# Patient Record
Sex: MALE | Race: WHITE | NOT HISPANIC OR LATINO | Employment: OTHER | URBAN - METROPOLITAN AREA
[De-identification: names, ages, dates, MRNs, and addresses within clinical notes are randomized per-mention and may not be internally consistent; named-entity substitution may affect disease eponyms.]

---

## 2020-08-07 RX ORDER — ASPIRIN 325 MG
325 TABLET ORAL EVERY OTHER DAY
COMMUNITY

## 2020-08-07 RX ORDER — ATORVASTATIN CALCIUM 40 MG/1
40 TABLET, FILM COATED ORAL DAILY
COMMUNITY
End: 2021-01-22 | Stop reason: SDUPTHER

## 2020-08-07 RX ORDER — UBIDECARENONE 10 MG
10 CAPSULE ORAL DAILY
COMMUNITY

## 2020-08-07 RX ORDER — DIPHENOXYLATE HYDROCHLORIDE AND ATROPINE SULFATE 2.5; .025 MG/1; MG/1
1 TABLET ORAL DAILY
COMMUNITY

## 2020-08-07 RX ORDER — LISINOPRIL 2.5 MG/1
TABLET ORAL
COMMUNITY
Start: 2020-07-09

## 2020-08-07 RX ORDER — LUTEIN 10 MG
TABLET ORAL DAILY
COMMUNITY

## 2020-08-07 RX ORDER — IRON POLYSACCHARIDE COMPLEX 150 MG
150 CAPSULE ORAL 2 TIMES DAILY
COMMUNITY
End: 2021-03-19 | Stop reason: ALTCHOICE

## 2020-08-07 RX ORDER — HYDROCORTISONE ACETATE 0.5 %
CREAM (GRAM) TOPICAL DAILY
COMMUNITY

## 2020-08-10 ENCOUNTER — OFFICE VISIT (OUTPATIENT)
Dept: FAMILY MEDICINE CLINIC | Facility: CLINIC | Age: 85
End: 2020-08-10
Payer: MEDICARE

## 2020-08-10 VITALS
RESPIRATION RATE: 16 BRPM | BODY MASS INDEX: 22.33 KG/M2 | HEIGHT: 70 IN | OXYGEN SATURATION: 97 % | SYSTOLIC BLOOD PRESSURE: 118 MMHG | TEMPERATURE: 99 F | WEIGHT: 156 LBS | DIASTOLIC BLOOD PRESSURE: 74 MMHG | HEART RATE: 72 BPM

## 2020-08-10 DIAGNOSIS — D86.9 SARCOIDOSIS: ICD-10-CM

## 2020-08-10 DIAGNOSIS — R73.03 PREDIABETES: Primary | ICD-10-CM

## 2020-08-10 DIAGNOSIS — M25.462 KNEE EFFUSION, LEFT: ICD-10-CM

## 2020-08-10 DIAGNOSIS — E53.8 B12 DEFICIENCY: ICD-10-CM

## 2020-08-10 DIAGNOSIS — E55.9 VITAMIN D DEFICIENCY: ICD-10-CM

## 2020-08-10 DIAGNOSIS — I25.10 CORONARY ARTERY DISEASE INVOLVING NATIVE HEART WITHOUT ANGINA PECTORIS, UNSPECIFIED VESSEL OR LESION TYPE: ICD-10-CM

## 2020-08-10 DIAGNOSIS — E61.1 IRON DEFICIENCY: ICD-10-CM

## 2020-08-10 PROBLEM — I34.1 MVP (MITRAL VALVE PROLAPSE): Status: ACTIVE | Noted: 2020-02-03

## 2020-08-10 PROBLEM — E78.5 HYPERLIPIDEMIA: Status: ACTIVE | Noted: 2020-08-10

## 2020-08-10 PROBLEM — R91.8 PULMONARY NODULES: Status: ACTIVE | Noted: 2020-02-02

## 2020-08-10 PROCEDURE — 3008F BODY MASS INDEX DOCD: CPT | Performed by: FAMILY MEDICINE

## 2020-08-10 PROCEDURE — 1160F RVW MEDS BY RX/DR IN RCRD: CPT | Performed by: FAMILY MEDICINE

## 2020-08-10 PROCEDURE — 4040F PNEUMOC VAC/ADMIN/RCVD: CPT | Performed by: FAMILY MEDICINE

## 2020-08-10 PROCEDURE — 1036F TOBACCO NON-USER: CPT | Performed by: FAMILY MEDICINE

## 2020-08-10 PROCEDURE — 99214 OFFICE O/P EST MOD 30 MIN: CPT | Performed by: FAMILY MEDICINE

## 2020-08-10 NOTE — PROGRESS NOTES
Assessment/Plan:    1  Prediabetes  -     Hemoglobin A1C; Future; Expected date: 08/10/2020    2  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 pectoris, unspecified vessel or lesion type  -     Lipid panel; Future; Expected date: 08/10/2020  -     Comprehensive metabolic panel; Future; Expected date: 08/10/2020  -     CBC; Future; Expected date: 08/10/2020    3  B12 deficiency  -     Vitamin B12; Future; Expected date: 08/10/2020    4  Vitamin D deficiency  -     Vitamin D 25 hydroxy; Future; Expected date: 08/10/2020    5  Iron deficiency  -     Iron, TIBC and Ferritin Panel; Future; Expected date: 08/10/2020    6  Knee effusion, left    7  Sarcoidosis         Subjective:      Patient ID: Tova Serrato is a 81 y o  male  HPI     Divine Portillo rode his bike here and feels well   Left knee with effusion   No pain     egd and colonoscopy done no cause for anemia or wt loss  small HH and left sided diverticulosis  stop the asmanex and the omeprazole    ferritin 10 take ferrex 150 qd    right sided kidney like back pain due to weak core     Sarcoidosis - He required surgery about 40 years ago with removal of part of the left lungTwo-view chest x-ray 4/5/2018: Stable changes consistent with sarcoid  No acute process noted; heart was normal in size    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      Pulmonary hypertension   Multiple nodules of lung   Benign prostatic hyperplasia - no current sx or meds     Dyspnea - Onset: 05/16/2018 - Echo 10/30/18: Mild LVH with EF 98% and diastolic dysfunction  Borderline prolapse of the posterior mitral valve leaflet noted  Left atrium moderately dilated  Mild aortic insufficiency, trace mitral regurgitation, and mild to moderate pulmonic insufficiency noted with RVSP at 50 mmHg  Echo 10/30/18: Mild LVH with EF 09% and diastolic dysfunction  Borderline prolapse of the posterior mitral valve leaflet noted  Left atrium moderately dilated  Mild aortic insufficiency, trace mitral regurgitation, and mild to moderate pulmonic insufficiency noted with RVSP at 50 mmHg  ETT 5/22/2018: Negative study with no EKG, or clinical evidence of myocardial ischemia  Exercised for 3 minutes achieving 85% APMHR  Stopped exercise because of fatigue and shortness of breath  No chest pain during, or after exercise    The following portions of the patient's history were reviewed and updated as appropriate: allergies, current medications, past family history, past medical history, past social history, past surgical history and problem list     Review of Systems   Constitutional: Negative for activity change, appetite change and fever  HENT: Negative for congestion, nosebleeds and trouble swallowing  Eyes: Negative for itching  Respiratory: Negative for cough and chest tightness  Cardiovascular: Negative for chest pain and palpitations  Gastrointestinal: Negative for abdominal pain, constipation, diarrhea and nausea  Endocrine: Negative for cold intolerance  Genitourinary: Negative for frequency  Musculoskeletal: Positive for joint swelling  Negative for gait problem  Skin: Negative for rash  Allergic/Immunologic: Negative for immunocompromised state  Neurological: Negative for dizziness, tremors, seizures, syncope and headaches  Psychiatric/Behavioral: Negative for hallucinations and suicidal ideas  Objective:       /74 (BP Location: Left arm, Patient Position: Sitting, Cuff Size: Standard)   Pulse 72   Temp 99 °F (37 2 °C) (Tympanic)   Resp 16   Ht 5' 10" (1 778 m)   Wt 70 8 kg (156 lb)   SpO2 97%   BMI 22 38 kg/m²     No visits with results within 2 Week(s) from this visit  Latest known visit with results is:   Lab Requisition on 10/18/2019   Component Date Value    Cholesterol 10/18/2019 175           Physical Exam  Vitals signs and nursing note reviewed  Constitutional:       General: He is not in acute distress  Appearance: He is well-developed  HENT:      Head: Normocephalic and atraumatic  Neck:      Musculoskeletal: Normal range of motion and neck supple  Cardiovascular:      Rate and Rhythm: Normal rate and regular rhythm  Heart sounds: Normal heart sounds  No murmur  Pulmonary:      Effort: Pulmonary effort is normal       Breath sounds: Normal breath sounds  No wheezing or rales  Abdominal:      General: Bowel sounds are normal  There is no distension  Palpations: Abdomen is soft  Tenderness: There is no abdominal tenderness  There is no guarding or rebound  Musculoskeletal: Normal range of motion  General: Swelling present  No tenderness  Lymphadenopathy:      Cervical: No cervical adenopathy  Skin:     General: Skin is warm and dry  Capillary Refill: Capillary refill takes less than 2 seconds  Findings: No rash  Neurological:      Mental Status: He is alert and oriented to person, place, and time  Cranial Nerves: No cranial nerve deficit  Sensory: No sensory deficit  Motor: No abnormal muscle tone  Psychiatric:         Behavior: Behavior normal          Thought Content:  Thought content normal          Judgment: Judgment normal              Mira Arriola MD  [place] [id number]

## 2020-08-11 ENCOUNTER — APPOINTMENT (OUTPATIENT)
Dept: LAB | Facility: CLINIC | Age: 85
End: 2020-08-11
Payer: MEDICARE

## 2020-08-11 DIAGNOSIS — E55.9 VITAMIN D DEFICIENCY: ICD-10-CM

## 2020-08-11 DIAGNOSIS — E53.8 B12 DEFICIENCY: ICD-10-CM

## 2020-08-11 DIAGNOSIS — E61.1 IRON DEFICIENCY: Primary | ICD-10-CM

## 2020-08-11 DIAGNOSIS — R73.03 PREDIABETES: ICD-10-CM

## 2020-08-11 DIAGNOSIS — I25.10 CORONARY ARTERY DISEASE INVOLVING NATIVE HEART WITHOUT ANGINA PECTORIS, UNSPECIFIED VESSEL OR LESION TYPE: ICD-10-CM

## 2020-08-11 LAB
25(OH)D3 SERPL-MCNC: 48 NG/ML (ref 30–100)
ALBUMIN SERPL BCP-MCNC: 3.1 G/DL (ref 3.5–5)
ALP SERPL-CCNC: 103 U/L (ref 46–116)
ALT SERPL W P-5'-P-CCNC: 21 U/L (ref 12–78)
ANION GAP SERPL CALCULATED.3IONS-SCNC: 8 MMOL/L (ref 4–13)
AST SERPL W P-5'-P-CCNC: 20 U/L (ref 5–45)
BILIRUB SERPL-MCNC: 0.46 MG/DL (ref 0.2–1)
BUN SERPL-MCNC: 22 MG/DL (ref 5–25)
CALCIUM SERPL-MCNC: 8.7 MG/DL (ref 8.3–10.1)
CHLORIDE SERPL-SCNC: 108 MMOL/L (ref 100–108)
CHOLEST SERPL-MCNC: 156 MG/DL (ref 50–200)
CO2 SERPL-SCNC: 26 MMOL/L (ref 21–32)
CREAT SERPL-MCNC: 0.98 MG/DL (ref 0.6–1.3)
ERYTHROCYTE [DISTWIDTH] IN BLOOD BY AUTOMATED COUNT: 13.6 % (ref 11.6–15.1)
EST. AVERAGE GLUCOSE BLD GHB EST-MCNC: 126 MG/DL
FERRITIN SERPL-MCNC: 34 NG/ML (ref 8–388)
GFR SERPL CREATININE-BSD FRML MDRD: 71 ML/MIN/1.73SQ M
GLUCOSE P FAST SERPL-MCNC: 96 MG/DL (ref 65–99)
HBA1C MFR BLD: 6 %
HCT VFR BLD AUTO: 37.9 % (ref 36.5–49.3)
HDLC SERPL-MCNC: 60 MG/DL
HGB BLD-MCNC: 12.4 G/DL (ref 12–17)
IRON SATN MFR SERPL: 18 %
IRON SERPL-MCNC: 52 UG/DL (ref 65–175)
LDLC SERPL CALC-MCNC: 80 MG/DL (ref 0–100)
MCH RBC QN AUTO: 31 PG (ref 26.8–34.3)
MCHC RBC AUTO-ENTMCNC: 32.7 G/DL (ref 31.4–37.4)
MCV RBC AUTO: 95 FL (ref 82–98)
NONHDLC SERPL-MCNC: 96 MG/DL
PLATELET # BLD AUTO: 271 THOUSANDS/UL (ref 149–390)
PMV BLD AUTO: 10.1 FL (ref 8.9–12.7)
POTASSIUM SERPL-SCNC: 4.4 MMOL/L (ref 3.5–5.3)
PROT SERPL-MCNC: 7.2 G/DL (ref 6.4–8.2)
RBC # BLD AUTO: 4 MILLION/UL (ref 3.88–5.62)
SODIUM SERPL-SCNC: 142 MMOL/L (ref 136–145)
TIBC SERPL-MCNC: 294 UG/DL (ref 250–450)
TRIGL SERPL-MCNC: 82 MG/DL
VIT B12 SERPL-MCNC: 537 PG/ML (ref 100–900)
WBC # BLD AUTO: 8.3 THOUSAND/UL (ref 4.31–10.16)

## 2020-08-11 PROCEDURE — 82306 VITAMIN D 25 HYDROXY: CPT

## 2020-08-11 PROCEDURE — 36415 COLL VENOUS BLD VENIPUNCTURE: CPT

## 2020-08-11 PROCEDURE — 83036 HEMOGLOBIN GLYCOSYLATED A1C: CPT

## 2020-08-11 PROCEDURE — 83540 ASSAY OF IRON: CPT

## 2020-08-11 PROCEDURE — 80053 COMPREHEN METABOLIC PANEL: CPT

## 2020-08-11 PROCEDURE — 80061 LIPID PANEL: CPT

## 2020-08-11 PROCEDURE — 83550 IRON BINDING TEST: CPT

## 2020-08-11 PROCEDURE — 85027 COMPLETE CBC AUTOMATED: CPT

## 2020-08-11 PROCEDURE — 82728 ASSAY OF FERRITIN: CPT

## 2020-08-11 PROCEDURE — 82607 VITAMIN B-12: CPT

## 2021-01-22 DIAGNOSIS — E78.2 MIXED HYPERLIPIDEMIA: Primary | ICD-10-CM

## 2021-01-22 RX ORDER — ATORVASTATIN CALCIUM 40 MG/1
40 TABLET, FILM COATED ORAL DAILY
Qty: 90 TABLET | Refills: 1 | Status: SHIPPED | OUTPATIENT
Start: 2021-01-22

## 2021-03-19 ENCOUNTER — OFFICE VISIT (OUTPATIENT)
Dept: FAMILY MEDICINE CLINIC | Facility: CLINIC | Age: 86
End: 2021-03-19
Payer: MEDICARE

## 2021-03-19 VITALS
BODY MASS INDEX: 22.9 KG/M2 | RESPIRATION RATE: 16 BRPM | SYSTOLIC BLOOD PRESSURE: 124 MMHG | HEIGHT: 70 IN | WEIGHT: 160 LBS | DIASTOLIC BLOOD PRESSURE: 78 MMHG

## 2021-03-19 DIAGNOSIS — Z00.00 WELL ADULT EXAM: Primary | ICD-10-CM

## 2021-03-19 DIAGNOSIS — E78.2 MIXED HYPERLIPIDEMIA: ICD-10-CM

## 2021-03-19 DIAGNOSIS — I27.20 PULMONARY HYPERTENSION (HCC): ICD-10-CM

## 2021-03-19 DIAGNOSIS — I25.10 CORONARY ARTERY DISEASE INVOLVING NATIVE CORONARY ARTERY OF NATIVE HEART WITHOUT ANGINA PECTORIS: ICD-10-CM

## 2021-03-19 DIAGNOSIS — L57.0 AK (ACTINIC KERATOSIS): ICD-10-CM

## 2021-03-19 DIAGNOSIS — E61.1 IRON DEFICIENCY: ICD-10-CM

## 2021-03-19 DIAGNOSIS — R73.03 PREDIABETES: ICD-10-CM

## 2021-03-19 PROCEDURE — G0439 PPPS, SUBSEQ VISIT: HCPCS | Performed by: FAMILY MEDICINE

## 2021-03-19 PROCEDURE — 99214 OFFICE O/P EST MOD 30 MIN: CPT | Performed by: FAMILY MEDICINE

## 2021-03-19 PROCEDURE — 1123F ACP DISCUSS/DSCN MKR DOCD: CPT | Performed by: FAMILY MEDICINE

## 2021-03-19 RX ORDER — IRON POLYSACCHARIDE COMPLEX 150 MG
150 CAPSULE ORAL 2 TIMES DAILY
Qty: 180 CAPSULE | Refills: 1 | Status: SHIPPED | OUTPATIENT
Start: 2021-03-19

## 2021-03-19 RX ORDER — 1.1% SODIUM FLUORIDE PRESCRIPTION DENTAL CREAM 5 MG/G
CREAM DENTAL
COMMUNITY
Start: 2021-01-26

## 2021-03-19 NOTE — PATIENT INSTRUCTIONS
Medicare Preventive Visit Patient Instructions  Thank you for completing your Welcome to Medicare Visit or Medicare Annual Wellness Visit today  Your next wellness visit will be due in one year (3/20/2022)  The screening/preventive services that you may require over the next 5-10 years are detailed below  Some tests may not apply to you based off risk factors and/or age  Screening tests ordered at today's visit but not completed yet may show as past due  Also, please note that scanned in results may not display below  Preventive Screenings:  Service Recommendations Previous Testing/Comments   Colorectal Cancer Screening  · Colonoscopy    · Fecal Occult Blood Test (FOBT)/Fecal Immunochemical Test (FIT)  · Fecal DNA/Cologuard Test  · Flexible Sigmoidoscopy Age: 54-65 years old   Colonoscopy: every 10 years (May be performed more frequently if at higher risk)  OR  FOBT/FIT: every 1 year  OR  Cologuard: every 3 years  OR  Sigmoidoscopy: every 5 years  Screening may be recommended earlier than age 48 if at higher risk for colorectal cancer  Also, an individualized decision between you and your healthcare provider will decide whether screening between the ages of 74-80 would be appropriate   Colonoscopy: Not on file  FOBT/FIT: Not on file  Cologuard: Not on file  Sigmoidoscopy: Not on file          Prostate Cancer Screening Individualized decision between patient and health care provider in men between ages of 53-78   Medicare will cover every 12 months beginning on the day after your 50th birthday PSA: No results in last 5 years     Screening Not Indicated     Hepatitis C Screening Once for adults born between [facility]  More frequently in patients at high risk for Hepatitis C Hep C Antibody: Not on file        Diabetes Screening 1-2 times per year if you're at risk for diabetes or have pre-diabetes Fasting glucose: 96 mg/dL   A1C: 6 0 %    Screening Current   Cholesterol Screening Once every 5 years if you don't have a lipid disorder  May order more often based on risk factors  Lipid panel: 08/11/2020    Screening Not Indicated  History Lipid Disorder      Other Preventive Screenings Covered by Medicare:  1  Abdominal Aortic Aneurysm (AAA) Screening: covered once if your at risk  You're considered to be at risk if you have a family history of AAA or a male between the age of 73-68 who smoking at least 100 cigarettes in your lifetime  2  Lung Cancer Screening: covers low dose CT scan once per year if you meet all of the following conditions: (1) Age 50-69; (2) No signs or symptoms of lung cancer; (3) Current smoker or have quit smoking within the last 15 years; (4) You have a tobacco smoking history of at least 30 pack years (packs per day x number of years you smoked); (5) You get a written order from a healthcare provider  3  Glaucoma Screening: covered annually if you're considered high risk: (1) You have diabetes OR (2) Family history of glaucoma OR (3)  aged 48 and older OR (3)  American aged 72 and older  3  Osteoporosis Screening: covered every 2 years if you meet one of the following conditions: (1) Have a vertebral abnormality; (2) On glucocorticoid therapy for more than 3 months; (3) Have primary hyperparathyroidism; (4) On osteoporosis medications and need to assess response to drug therapy  5  HIV Screening: covered annually if you're between the age of 12-76  Also covered annually if you are younger than 13 and older than 72 with risk factors for HIV infection  For pregnant patients, it is covered up to 3 times per pregnancy      Immunizations:  Immunization Recommendations   Influenza Vaccine Annual influenza vaccination during flu season is recommended for all persons aged >= 6 months who do not have contraindications   Pneumococcal Vaccine (Prevnar and Pneumovax)  * Prevnar = PCV13  * Pneumovax = PPSV23 Adults 25-60 years old: 1-3 doses may be recommended based on certain risk factors  Adults 72 years old: Prevnar (PCV13) vaccine recommended followed by Pneumovax (PPSV23) vaccine  If already received PPSV23 since turning 65, then PCV13 recommended at least one year after PPSV23 dose  Hepatitis B Vaccine 3 dose series if at intermediate or high risk (ex: diabetes, end stage renal disease, liver disease)   Tetanus (Td) Vaccine - COST NOT COVERED BY MEDICARE PART B Following completion of primary series, a booster dose should be given every 10 years to maintain immunity against tetanus  Td may also be given as tetanus wound prophylaxis  Tdap Vaccine - COST NOT COVERED BY MEDICARE PART B Recommended at least once for all adults  For pregnant patients, recommended with each pregnancy  Shingles Vaccine (Shingrix) - COST NOT COVERED BY MEDICARE PART B  2 shot series recommended in those aged 48 and above     Health Maintenance Due:  There are no preventive care reminders to display for this patient  Immunizations Due:  There are no preventive care reminders to display for this patient  Advance Directives   What are advance directives? Advance directives are legal documents that state your wishes and plans for medical care  These plans are made ahead of time in case you lose your ability to make decisions for yourself  Advance directives can apply to any medical decision, such as the treatments you want, and if you want to donate organs  What are the types of advance directives? There are many types of advance directives, and each state has rules about how to use them  You may choose a combination of any of the following:  · Living will: This is a written record of the treatment you want  You can also choose which treatments you do not want, which to limit, and which to stop at a certain time  This includes surgery, medicine, IV fluid, and tube feedings  · Durable power of  for healthcare [facility]):   This is a written record that states who you want to make healthcare choices for you when you are unable to make them for yourself  This person, called a proxy, is usually a family member or a friend  You may choose more than 1 proxy  · Do not resuscitate (DNR) order:  A DNR order is used in case your heart stops beating or you stop breathing  It is a request not to have certain forms of treatment, such as CPR  A DNR order may be included in other types of advance directives  · Medical directive: This covers the care that you want if you are in a coma, near death, or unable to make decisions for yourself  You can list the treatments you want for each condition  Treatment may include pain medicine, surgery, blood transfusions, dialysis, IV or tube feedings, and a ventilator (breathing machine)  · Values history: This document has questions about your views, beliefs, and how you feel and think about life  This information can help others choose the care that you would choose  Why are advance directives important? An advance directive helps you control your care  Although spoken wishes may be used, it is better to have your wishes written down  Spoken wishes can be misunderstood, or not followed  Treatments may be given even if you do not want them  An advance directive may make it easier for your family to make difficult choices about your care  © Copyright [employer] 2018 Information is for End User's use only and may not be sold, redistributed or otherwise used for commercial purposes   All illustrations and images included in CareNotes® are the copyrighted property of [employer]  or [address] [place] [address] [facility]

## 2021-03-19 NOTE — PROGRESS NOTES
Assessment/Plan:    1  Iron deficiency  Comments:  getting better - cold all the time   on orals   Orders:  -     iron polysaccharides (Ferrex 150) 150 mg capsule; Take 1 capsule (150 mg total) by mouth 2 (two) times a day  -     Iron Panel (Includes Ferritin, Iron Sat%, Iron, and TIBC); Future    2  Pulmonary hypertension ([facility] [address] )    3  Prediabetes  -     HEMOGLOBIN A1C W/ EAG ESTIMATION; Future    4  AK (actinic keratosis)  Comments:  or basal ? - either [facility] or [facility]   Orders:  -     Ambulatory referral to Dermatology; Future    5  Mixed hyperlipidemia  Comments:  statin - stable     6  Coronary artery disease involving native coronary artery of native heart without angina pectoris  Comments:  follows with [facility] - stable bikes daily          Subjective:      Patient ID: Gwen Chen is a 81 y o  male  HPI   Feels he as allum in his mouth   Since the hollidays     Stefania Henson rode his bike here and feels well   Left knee with effusion   No pain     HLD:  lipitor 40mg     htn on lisinopriol 2 5     egd and colonoscopy done no cause for anemia or wt loss  small HH and left sided diverticulosis  stop the asmanex and the omeprazole    ferritin 10 to 34  take ferrex 150 qd    Sarcoidosis - He required surgery about 40 years ago with removal of part of the left lungTwo-view chest x-ray 4/5/2018: Stable changes consistent with sarcoid  No acute process noted; heart was normal in size    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    Pulmonary hypertension   Multiple nodules of lung   Benign prostatic hyperplasia - no current sx or meds     Dyspnea - Onset: 05/16/2018 - Echo 10/30/18: Mild LVH with EF 50% and diastolic dysfunction  Borderline prolapse of the posterior mitral valve leaflet noted  Left atrium moderately dilated  Mild aortic insufficiency, trace mitral regurgitation, and mild to moderate pulmonic insufficiency noted with RVSP at 50 mmHg      Echo 10/30/18: Mild LVH with EF 60% and diastolic dysfunction  Borderline prolapse of the posterior mitral valve leaflet noted  Left atrium moderately dilated  Mild aortic insufficiency, trace mitral regurgitation, and mild to moderate pulmonic insufficiency noted with RVSP at 50 mmHg  ETT 5/22/2018: Negative study with no EKG, or clinical evidence of myocardial ischemia  Exercised for 3 minutes achieving 85% APMHR  Stopped exercise because of fatigue and shortness of breath  No chest pain during, or after exercise      The following portions of the patient's history were reviewed and updated as appropriate: allergies, current medications, past family history, past medical history, past social history, past surgical history and problem list     Review of Systems   Constitutional: Negative for activity change, appetite change and fever  HENT: Negative for congestion, nosebleeds and trouble swallowing  Eyes: Negative for itching  Respiratory: Negative for cough and chest tightness  Cardiovascular: Negative for chest pain and palpitations  Gastrointestinal: Negative for abdominal pain, constipation, diarrhea and nausea  Endocrine: Negative for cold intolerance  Genitourinary: Negative for frequency  Musculoskeletal: Negative for gait problem and joint swelling  Skin: Negative for rash  Allergic/Immunologic: Negative for immunocompromised state  Neurological: Negative for dizziness, tremors, seizures, syncope and headaches  Psychiatric/Behavioral: Negative for hallucinations and suicidal ideas  Objective:      /78 (BP Location: Right arm, Patient Position: Sitting, Cuff Size: Standard)   Resp 16   Ht 5' 10" (1 778 m)   Wt 72 6 kg (160 lb)   BMI 22 96 kg/m²     No visits with results within 2 Week(s) from this visit     Latest known visit with results is:   Appointment on 08/11/2020   Component Date Value    Vit D, 25-Hydroxy 08/11/2020 48 0     Cholesterol 08/11/2020 156     Triglycerides 08/11/2020 82     HDL, Direct 08/11/2020 60     LDL Calculated 08/11/2020 80     Non-HDL-Chol (CHOL-HDL) 08/11/2020 96     Sodium 08/11/2020 142     Potassium 08/11/2020 4 4     Chloride 08/11/2020 108     CO2 08/11/2020 26     ANION GAP 08/11/2020 8     BUN 08/11/2020 22     Creatinine 08/11/2020 0 98     Glucose, Fasting 08/11/2020 96     Calcium 08/11/2020 8 7     AST 08/11/2020 20     ALT 08/11/2020 21     Alkaline Phosphatase 08/11/2020 103     Total Protein 08/11/2020 7 2     Albumin 08/11/2020 3 1*    Total Bilirubin 08/11/2020 0 46     eGFR 08/11/2020 71     WBC 08/11/2020 8 30     RBC 08/11/2020 4 00     Hemoglobin 08/11/2020 12 4     Hematocrit 08/11/2020 37 9     MCV 08/11/2020 95     MCH 08/11/2020 31 0     MCHC 08/11/2020 32 7     RDW 08/11/2020 13 6     Platelets 33/54/6925 271     MPV 08/11/2020 10 1     Vitamin B-12 08/11/2020 537     Hemoglobin A1C 08/11/2020 6 0*    EAG 08/11/2020 126     Iron Saturation 08/11/2020 18     TIBC 08/11/2020 294     Iron 08/11/2020 52*    Ferritin 08/11/2020 34           Physical Exam  Vitals signs and nursing note reviewed  Constitutional:       General: He is not in acute distress  Appearance: He is well-developed  HENT:      Head: Normocephalic and atraumatic  Neck:      Musculoskeletal: Normal range of motion and neck supple  Cardiovascular:      Rate and Rhythm: Normal rate and regular rhythm  Heart sounds: Normal heart sounds  No murmur  Pulmonary:      Effort: Pulmonary effort is normal       Breath sounds: Normal breath sounds  No wheezing or rales  Abdominal:      General: Bowel sounds are normal  There is no distension  Palpations: Abdomen is soft  Tenderness: There is no abdominal tenderness  There is no guarding or rebound  Musculoskeletal: Normal range of motion  General: No tenderness  Lymphadenopathy:      Cervical: No cervical adenopathy  Skin:     General: Skin is warm and dry  Capillary Refill: Capillary refill takes less than 2 seconds  Findings: No rash  Neurological:      Mental Status: He is alert and oriented to person, place, and time  Cranial Nerves: No cranial nerve deficit  Sensory: No sensory deficit  Motor: No abnormal muscle tone  Psychiatric:         Behavior: Behavior normal          Thought Content:  Thought content normal          Judgment: Judgment normal                    Abbie Delarosa MD  [place] [id number]

## 2021-03-19 NOTE — PROGRESS NOTES
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 - either [facility] or [facility]     Relevant Orders    Ambulatory referral to Dermatology           Preventive health issues were discussed with patient, and age appropriate screening tests were ordered as noted in patient's After Visit Summary  Personalized health advice and appropriate referrals for health education or preventive services given if needed, as noted in patient's After Visit Summary       History of Present Illness:     Patient presents for Medicare Annual Wellness visit    Patient Care Team:  Sarah Hernandez MD as PCP - General (Family Medicine)     Problem List:     Patient Active Problem List   Diagnosis    Prediabetes    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 pectoris    Iron deficiency    Pulmonary hypertension ([address] )    Hypertension    Sarcoidosis    Pulmonary nodules    MVP (mitral valve prolapse)    Hyperlipidemia      Past Medical and Surgical History:     Past Medical History:   Diagnosis Date    Cancer ([address] )     Lesion left calf removed    Hypertension     Pulmonary hypertension ([address] )      Past Surgical History:   Procedure Laterality Date    APPENDECTOMY      CATARACT EXTRACTION, BILATERAL      COLONOSCOPY  06/2018      Family History:     Family History   Problem Relation Age of Onset    Heart disease Father     Sudden death Father 67    COPD Neg Hx     Asthma Neg Hx  Lung cancer Neg Hx       Social History:        Social History     Socioeconomic History    Marital status:      Spouse name: None    Number of children: 2    Years of education: 15    Highest education level: None   Occupational History    Occupation: RETIRED   Social Needs    Financial resource strain: None    Food insecurity     Worry: None     Inability: None    Transportation needs     Medical: None     Non-medical: None   Tobacco Use    Smoking status: Never Smoker    Smokeless tobacco: Never Used   Substance and Sexual Activity    Alcohol use: None     Comment: NOne    Drug use: None     Comment: NONE    Sexual activity: None   Lifestyle    Physical activity     Days per week: None     Minutes per session: None    Stress: None   Relationships    Social connections     Talks on phone: None     Gets together: None     Attends [employer]ism service: None     Active member of club or organization: None     Attends meetings of clubs or organizations: None     Relationship status: None    Intimate partner violence     Fear of current or ex partner: None     Emotionally abused: None     Physically abused: None     Forced sexual activity: None   Other Topics Concern    None   Social History Narrative    Most recent tobacco use screenin2020    Do you currently or have you served in the [employer]: No    Were you activated, into active duty, as a member of the [employer] or as a Reservist: No    Advance directive: No    Alcohol intake: None    Advance directive - Provider has reviewed directives and consents to follow them (insert provider name with any objections in notes field): No    Caffeine intake: None    Illicit drugs: none    Occupation: retired    Exercise level: Moderate    Single or multi-level home/work: single level home    Live alone or with others: alone    Chewing tobacco: none    Marital status:      Number of children: 2    Diet: Regular    Sexual orientation: Heterosexual    Smoke alarm in home: Yes    General stress level: Low    Sunscreen used routinely: No    Education: 12    Guns present in home: No    Presence of domestic violence: No    Passive smoke exposure: Yes    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 ; printing    Asbestos exposure: No    TB exposure: No    Environmental exposure: No    Animal exposure: No    Legally blind in one or both eyes: No      Medications and Allergies:     Current Outpatient Medications   Medication Sig Dispense Refill    Ascorbic Acid, Vitamin C, (VITAMIN C) 100 MG tablet Take by mouth daily      atorvastatin (LIPITOR) 40 mg tablet Take 1 tablet (40 mg total) by mouth daily 90 tablet 1    Coenzyme Q10 10 MG capsule Take 10 mg by mouth daily      Docosahexaenoic Acid--270 MG CAPS Take by mouth daily      Glucosamine-Chondroit-Vit C-Mn (Glucosamine Chondr 1500 Complx) CAPS Take by mouth daily      lisinopril (ZESTRIL) 2 5 mg tablet TAKE 1 TABLET BY MOUTH EVERY DAY      Lutein 10 MG TABS Take by mouth daily      multivitamin (THERAGRAN) TABS Take 1 tablet by mouth daily      SF 5000 Plus 1 1 % CREA BRUSH FOR 2 MINUTES ONCE DAILY AT BEDTIME  EXPECTORATE  NOTHING BY MOUTH 30 MINUTES      vitamin E, tocopherol, 200 units capsule Take 200 Units by mouth daily      aspirin 325 mg tablet Take 325 mg by mouth      iron polysaccharides (Ferrex 150) 150 mg capsule Take 1 capsule (150 mg total) by mouth 2 (two) times a day 180 capsule 1     No current facility-administered medications for this visit  No Known Allergies   Immunizations:     Immunization History   Administered Date(s) Administered    DTaP 08/11/2014    INFLUENZA 09/28/2018    Influenza, high dose seasonal 0 7 mL 11/06/2020    Pneumococcal Conjugate 13-Valent 03/23/2018    Pneumococcal Polysaccharide PPV23 08/11/2014    SARS-CoV-2 / COVID-19 mRNA IM (Moderna) 03/02/2021    Tdap 08/11/2014      Health Maintenance:      There are no preventive care reminders to display for this patient  There are no preventive care reminders to display for this patient  Medicare Health Risk Assessment:     /78 (BP Location: Right arm, Patient Position: Sitting, Cuff Size: Standard)   Resp 16   Ht 5' 10" (1 778 m)   Wt 72 6 kg (160 lb)   BMI 22 96 kg/m²      Charisse Roger is here for his Subsequent Wellness visit  Last Medicare Wellness visit information reviewed, patient interviewed and updates made to the record today  Health Risk Assessment:   Patient rates overall health as very good  Patient feels that their physical health rating is same  Patient is very satisfied with their life  Eyesight was rated as same  Hearing was rated as same  Patient feels that their emotional and mental health rating is same  Patients states they are never, rarely angry  Patient states they are never, rarely unusually tired/fatigued  Pain experienced in the last 7 days has been none  Patient states that he has experienced no weight loss or gain in last 6 months  Depression Screening:   PHQ-2 Score: 0      Fall Risk Screening: In the past year, patient has experienced: no history of falling in past year      Home Safety:  Patient does not have trouble with stairs inside or outside of their home  Patient has working smoke alarms and has working carbon monoxide detector  Home safety hazards include: none  Nutrition:   Current diet is Regular  Medications:   Patient is currently taking over-the-counter supplements  OTC medications include: see medication list  Patient is able to manage medications  Activities of Daily Living (ADLs)/Instrumental Activities of Daily Living (IADLs):   Walk and transfer into and out of bed and chair?: Yes  Dress and groom yourself?: Yes    Bathe or shower yourself?: Yes    Feed yourself?  Yes  Do your laundry/housekeeping?: Yes  Manage your money, pay your bills and track your expenses?: Yes  Make your own meals?: Yes    Do your own shopping?: Yes    Previous Hospitalizations:   Any hospitalizations or ED visits within the last 12 months?: No      Advance Care Planning:   Living will: No    Durable POA for healthcare: No    Advanced directive: No      Cognitive Screening:   Provider or family/friend/caregiver concerned regarding cognition?: No    PREVENTIVE SCREENINGS      Cardiovascular Screening:    General: Screening Not Indicated and History Lipid Disorder      Diabetes Screening:     General: Screening Current      Colorectal Cancer Screening:     General: Screening Not Indicated      Prostate Cancer Screening:    General: Screening Not Indicated      Osteoporosis Screening:      Due for: Bone Density Ultrasound      Abdominal Aortic Aneurysm (AAA) Screening:        General: Screening Not Indicated      Lung Cancer Screening:     General: Screening Not Indicated      Hepatitis C Screening:      Hep C Screening Accepted: No     Screening, Brief Intervention, and Referral to Treatment (SBIRT)    Screening  Typical number of drinks in a day: 0  Typical number of drinks in a week: 0  Interpretation: Low risk drinking behavior  Single Item Drug Screening:  How often have you used an illegal drug (including marijuana) or a prescription medication for non-medical reasons in the past year? never    Single Item Drug Screen Score: 0  Interpretation: Negative screen for possible drug use disorder    Brief Intervention  Alcohol & drug use screenings were reviewed  No concerns regarding substance use disorder identified         Perri Duran MD

## 2021-03-22 ENCOUNTER — TRANSCRIBE ORDERS (OUTPATIENT)
Dept: LAB | Facility: CLINIC | Age: 86
End: 2021-03-22

## 2021-03-22 ENCOUNTER — APPOINTMENT (OUTPATIENT)
Dept: LAB | Facility: CLINIC | Age: 86
End: 2021-03-22
Payer: MEDICARE

## 2021-03-22 DIAGNOSIS — R73.03 PREDIABETES: ICD-10-CM

## 2021-03-22 DIAGNOSIS — E61.1 IRON DEFICIENCY: ICD-10-CM

## 2021-03-22 LAB
EST. AVERAGE GLUCOSE BLD GHB EST-MCNC: 123 MG/DL
FERRITIN SERPL-MCNC: 47 NG/ML (ref 8–388)
HBA1C MFR BLD: 5.9 %
IRON SATN MFR SERPL: 33 %
IRON SERPL-MCNC: 109 UG/DL (ref 65–175)
TIBC SERPL-MCNC: 330 UG/DL (ref 250–450)

## 2021-03-22 PROCEDURE — 36415 COLL VENOUS BLD VENIPUNCTURE: CPT

## 2021-03-22 PROCEDURE — 83550 IRON BINDING TEST: CPT

## 2021-03-22 PROCEDURE — 83036 HEMOGLOBIN GLYCOSYLATED A1C: CPT

## 2021-03-22 PROCEDURE — 83540 ASSAY OF IRON: CPT

## 2021-03-22 PROCEDURE — 82728 ASSAY OF FERRITIN: CPT

## 2021-04-22 ENCOUNTER — OFFICE VISIT (OUTPATIENT)
Dept: FAMILY MEDICINE CLINIC | Facility: CLINIC | Age: 86
End: 2021-04-22
Payer: MEDICARE

## 2021-04-22 VITALS
HEIGHT: 70 IN | DIASTOLIC BLOOD PRESSURE: 78 MMHG | SYSTOLIC BLOOD PRESSURE: 122 MMHG | OXYGEN SATURATION: 98 % | RESPIRATION RATE: 16 BRPM | HEART RATE: 88 BPM | BODY MASS INDEX: 23.19 KG/M2 | WEIGHT: 162 LBS

## 2021-04-22 DIAGNOSIS — E78.2 MIXED HYPERLIPIDEMIA: ICD-10-CM

## 2021-04-22 DIAGNOSIS — I25.10 CORONARY ARTERY DISEASE INVOLVING NATIVE CORONARY ARTERY OF NATIVE HEART WITHOUT ANGINA PECTORIS: ICD-10-CM

## 2021-04-22 DIAGNOSIS — H02.402 PTOSIS OF LEFT EYELID: Primary | ICD-10-CM

## 2021-04-22 DIAGNOSIS — E61.1 IRON DEFICIENCY: ICD-10-CM

## 2021-04-22 DIAGNOSIS — G45.9 TIA (TRANSIENT ISCHEMIC ATTACK): ICD-10-CM

## 2021-04-22 DIAGNOSIS — R73.03 PREDIABETES: ICD-10-CM

## 2021-04-22 PROCEDURE — 99214 OFFICE O/P EST MOD 30 MIN: CPT | Performed by: FAMILY MEDICINE

## 2021-04-22 NOTE — PROGRESS NOTES
Assessment/Plan    :take lipitor 80mg daily  Take 1/2   4x a week   And MRI to determine if really a stroke   CT scan at this point is not helpful as this is not acute       1  Ptosis of left eyelid  -     MRI brain w wo contrast; Future; Expected date: 04/22/2021    2  TIA (transient ischemic attack)    3  Prediabetes    4  Coronary artery disease involving native coronary artery of native heart without angina pectoris    5  Mixed hyperlipidemia    6  Iron deficiency         Subjective:      Patient ID: Chichi Trejo is a 81 y o  male  HPI   Sat with left sided eye droop never went to ER   Sunday am resolved   Saw derm who said it was a stroke     No dysarthria or weakness   No recurence   Hx of CAD  MVP and PHTN   Is on  2x a week from cardio       The following portions of the patient's history were reviewed and updated as appropriate: allergies, current medications, past family history, past medical history, past social history, past surgical history and problem list     Review of Systems   Constitutional: Negative for activity change, appetite change and fever  HENT: Negative for congestion, nosebleeds and trouble swallowing  Eyes: Negative for itching  Respiratory: Negative for cough and chest tightness  Cardiovascular: Negative for chest pain and palpitations  Gastrointestinal: Negative for abdominal pain, constipation, diarrhea and nausea  Endocrine: Negative for cold intolerance  Genitourinary: Negative for frequency  Musculoskeletal: Negative for gait problem and joint swelling  Skin: Negative for rash  Allergic/Immunologic: Negative for immunocompromised state  Neurological: Negative for dizziness, tremors, seizures, syncope and headaches  Psychiatric/Behavioral: Negative for hallucinations and suicidal ideas           Objective:      /78 (BP Location: Left arm, Patient Position: Sitting, Cuff Size: Standard)   Pulse 88   Resp 16   Ht 5' 10" (1 778 m) Wt 73 5 kg (162 lb)   SpO2 98%   BMI 23 24 kg/m²     No visits with results within 2 Week(s) from this visit  Latest known visit with results is:   Appointment on 03/22/2021   Component Date Value    Hemoglobin A1C 03/22/2021 5 9*    EAG 03/22/2021 123     Iron Saturation 03/22/2021 33     TIBC 03/22/2021 330     Iron 03/22/2021 109     Ferritin 03/22/2021 47           Physical Exam  Vitals signs and nursing note reviewed  Constitutional:       General: He is not in acute distress  Appearance: He is well-developed  HENT:      Head: Normocephalic and atraumatic  Neck:      Musculoskeletal: Normal range of motion and neck supple  Cardiovascular:      Rate and Rhythm: Normal rate and regular rhythm  Heart sounds: Normal heart sounds  No murmur  Pulmonary:      Effort: Pulmonary effort is normal       Breath sounds: Normal breath sounds  No wheezing or rales  Abdominal:      General: Bowel sounds are normal  There is no distension  Palpations: Abdomen is soft  Tenderness: There is no abdominal tenderness  There is no guarding or rebound  Musculoskeletal: Normal range of motion  General: No tenderness  Lymphadenopathy:      Cervical: No cervical adenopathy  Skin:     General: Skin is warm and dry  Capillary Refill: Capillary refill takes less than 2 seconds  Findings: No rash  Neurological:      Mental Status: He is alert and oriented to person, place, and time  Cranial Nerves: No cranial nerve deficit  Sensory: No sensory deficit  Motor: No abnormal muscle tone  Psychiatric:         Behavior: Behavior normal          Thought Content:  Thought content normal          Judgment: Judgment normal              Donnie Almonte MD  [place] [id number]

## 2021-04-29 ENCOUNTER — HOSPITAL ENCOUNTER (OUTPATIENT)
Dept: RADIOLOGY | Age: 86
Discharge: HOME | End: 2021-04-29
Attending: FAMILY MEDICINE
Payer: MEDICARE

## 2021-04-29 DIAGNOSIS — H02.402 PTOSIS OF LEFT EYELID: ICD-10-CM

## 2021-04-29 PROCEDURE — 70553 MRI BRAIN STEM W/O & W/DYE: CPT

## 2021-04-29 PROCEDURE — G1004 CDSM NDSC: HCPCS

## 2021-04-29 PROCEDURE — A9585 GADOBUTROL INJECTION: HCPCS | Performed by: FAMILY MEDICINE

## 2021-04-29 RX ADMIN — GADOBUTROL 7 ML: 604.72 INJECTION INTRAVENOUS at 10:18

## 2021-05-05 ENCOUNTER — TELEPHONE (OUTPATIENT)
Dept: FAMILY MEDICINE CLINIC | Facility: CLINIC | Age: 86
End: 2021-05-05

## 2021-05-05 NOTE — TELEPHONE ENCOUNTER
[facility] is calling to get the results of MRI of the brain    Please call back

## 2021-05-24 ENCOUNTER — OFFICE VISIT (OUTPATIENT)
Dept: FAMILY MEDICINE CLINIC | Facility: CLINIC | Age: 86
End: 2021-05-24
Payer: MEDICARE

## 2021-05-24 VITALS
RESPIRATION RATE: 16 BRPM | OXYGEN SATURATION: 97 % | HEART RATE: 56 BPM | BODY MASS INDEX: 23.62 KG/M2 | HEIGHT: 70 IN | WEIGHT: 165 LBS | DIASTOLIC BLOOD PRESSURE: 82 MMHG | SYSTOLIC BLOOD PRESSURE: 122 MMHG

## 2021-05-24 DIAGNOSIS — G45.9 TIA (TRANSIENT ISCHEMIC ATTACK): Primary | ICD-10-CM

## 2021-05-24 PROCEDURE — 99213 OFFICE O/P EST LOW 20 MIN: CPT | Performed by: FAMILY MEDICINE

## 2021-05-24 RX ORDER — CEPHALEXIN 500 MG/1
500 CAPSULE ORAL 3 TIMES DAILY
COMMUNITY
Start: 2021-05-21 | End: 2021-09-17 | Stop reason: ALTCHOICE

## 2021-05-24 NOTE — PROGRESS NOTES
Assessment/Plan:    1  TIA (transient ischemic attack)       Since the last visit has not had any resudial issues   No lightheadness / dizziness   Ptosis   unblanace or weakness   MRI is neg for stroke only microangiopathic changes    Cont with ASA 4 x a week   And statin 40mg    Subjective:      Patient ID: Nena Morris is a 81 y o  male  HPI  Since the last visit has not had any resudial issues   No lightheadness / dizziness   Ptosis   unblanace or weakness   MRI is neg for stroke only microangiopathic changes    Cont with ASA 4 x a week   And statin 40mg     The following portions of the patient's history were reviewed and updated as appropriate: allergies, current medications, past family history, past medical history, past social history, past surgical history and problem list     Review of Systems   Constitutional: Negative for activity change, appetite change and fever  HENT: Negative for congestion, nosebleeds and trouble swallowing  Eyes: Negative for itching  Respiratory: Negative for cough and chest tightness  Cardiovascular: Negative for chest pain and palpitations  Gastrointestinal: Negative for abdominal pain, constipation, diarrhea and nausea  Endocrine: Negative for cold intolerance  Genitourinary: Negative for frequency  Musculoskeletal: Negative for gait problem and joint swelling  Skin: Negative for rash  Allergic/Immunologic: Negative for immunocompromised state  Neurological: Negative for dizziness, tremors, seizures, syncope and headaches  Psychiatric/Behavioral: Negative for hallucinations and suicidal ideas  Objective:      /82 (BP Location: Left arm, Patient Position: Sitting, Cuff Size: Standard)   Pulse 56   Resp 16   Ht 5' 10" (1 778 m)   Wt 74 8 kg (165 lb)   SpO2 97%   BMI 23 68 kg/m²     No visits with results within 2 Week(s) from this visit     Latest known visit with results is:   Appointment on 03/22/2021   Component Date Value  Hemoglobin A1C 03/22/2021 5 9*    EAG 03/22/2021 123     Iron Saturation 03/22/2021 33     TIBC 03/22/2021 330     Iron 03/22/2021 109     Ferritin 03/22/2021 47           Physical Exam  Vitals signs and nursing note reviewed  Constitutional:       General: He is not in acute distress  Appearance: He is well-developed  HENT:      Head: Normocephalic and atraumatic  Neck:      Musculoskeletal: Normal range of motion and neck supple  Cardiovascular:      Rate and Rhythm: Normal rate and regular rhythm  Heart sounds: Normal heart sounds  No murmur  Pulmonary:      Effort: Pulmonary effort is normal       Breath sounds: Normal breath sounds  No wheezing or rales  Abdominal:      General: Bowel sounds are normal  There is no distension  Palpations: Abdomen is soft  Tenderness: There is no abdominal tenderness  There is no guarding or rebound  Musculoskeletal: Normal range of motion  General: No tenderness  Lymphadenopathy:      Cervical: No cervical adenopathy  Skin:     General: Skin is warm and dry  Capillary Refill: Capillary refill takes less than 2 seconds  Findings: No rash  Neurological:      Mental Status: He is alert and oriented to person, place, and time  Cranial Nerves: No cranial nerve deficit  Sensory: No sensory deficit  Motor: No abnormal muscle tone  Psychiatric:         Behavior: Behavior normal          Thought Content:  Thought content normal          Judgment: Judgment normal               Gunner Diane MD  [place] [id number]

## 2021-09-17 ENCOUNTER — OFFICE VISIT (OUTPATIENT)
Dept: FAMILY MEDICINE CLINIC | Facility: CLINIC | Age: 86
End: 2021-09-17
Payer: MEDICARE

## 2021-09-17 VITALS
RESPIRATION RATE: 16 BRPM | BODY MASS INDEX: 22.9 KG/M2 | SYSTOLIC BLOOD PRESSURE: 122 MMHG | HEIGHT: 70 IN | WEIGHT: 160 LBS | DIASTOLIC BLOOD PRESSURE: 76 MMHG | HEART RATE: 95 BPM | OXYGEN SATURATION: 96 %

## 2021-09-17 DIAGNOSIS — E78.2 MIXED HYPERLIPIDEMIA: ICD-10-CM

## 2021-09-17 DIAGNOSIS — R73.03 PREDIABETES: ICD-10-CM

## 2021-09-17 DIAGNOSIS — I25.10 CORONARY ARTERY DISEASE INVOLVING NATIVE CORONARY ARTERY OF NATIVE HEART WITHOUT ANGINA PECTORIS: ICD-10-CM

## 2021-09-17 DIAGNOSIS — E61.1 IRON DEFICIENCY: Primary | ICD-10-CM

## 2021-09-17 DIAGNOSIS — I10 ESSENTIAL HYPERTENSION: ICD-10-CM

## 2021-09-17 PROCEDURE — 99214 OFFICE O/P EST MOD 30 MIN: CPT | Performed by: FAMILY MEDICINE

## 2021-09-17 NOTE — PROGRESS NOTES
Assessment/Plan:    1  Iron deficiency  Assessment & Plan:  -stable/controlled, continue same medication  Will evaluate again next visit       Orders:  -     Iron Panel (Includes Ferritin, Iron Sat%, Iron, and TIBC); Future    2  Coronary artery disease involving native coronary artery of native heart without angina pectoris  Assessment & Plan:  -stable/controlled, continue same medication  Will evaluate again next visit       Orders:  -     Lipid Panel with Direct LDL reflex; Future  -     CBC and differential; Future  -     Basic metabolic panel; Future    3  Prediabetes  Assessment & Plan:  -stable/controlled, continue same medication  Will evaluate again next visit       Orders:  -     HEMOGLOBIN A1C W/ EAG ESTIMATION; Future    4  Essential hypertension  Assessment & Plan:  -stable/controlled, continue same medication  Will evaluate again next visit         5  Mixed hyperlipidemia  Assessment & Plan:  -stable/controlled, continue same medication  Will evaluate again next visit            Subjective:      Patient ID: Teresita Nguyen is a 81 y o  male  HPI     Donald Leong rode his bike here and feels well   Left knee with effusion   No pain     HLD:  lipitor 40mg     htn on lisinopriol 2 5     egd and colonoscopy done no cause for anemia or wt loss  small HH and left sided diverticulosis  stop the asmanex and the omeprazole    ferritin 10 to 34  take ferrex 150 qd    Sarcoidosis - He required surgery about 40 years ago with removal of part of the left lungTwo-view chest x-ray 4/5/2018: Stable changes consistent with sarcoid  No acute process noted; heart was normal in size    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    Pulmonary hypertension   Multiple nodules of lung   Benign prostatic hyperplasia - no current sx or meds     Dyspnea - Onset: 05/16/2018 - Echo 10/30/18: Mild LVH with EF 97% and diastolic dysfunction  Borderline prolapse of the posterior mitral valve leaflet noted  Left atrium moderately dilated  Mild aortic insufficiency, trace mitral regurgitation, and mild to moderate pulmonic insufficiency noted with RVSP at 50 mmHg  Echo 10/30/18: Mild LVH with EF 03% and diastolic dysfunction  Borderline prolapse of the posterior mitral valve leaflet noted  Left atrium moderately dilated  Mild aortic insufficiency, trace mitral regurgitation, and mild to moderate pulmonic insufficiency noted with RVSP at 50 mmHg  ETT 5/22/2018: Negative study with no EKG, or clinical evidence of myocardial ischemia  Exercised for 3 minutes achieving 85% APMHR  Stopped exercise because of fatigue and shortness of breath  No chest pain during, or after exercise      The following portions of the patient's history were reviewed and updated as appropriate: allergies, current medications, past family history, past medical history, past social history, past surgical history and problem list     Review of Systems   Constitutional: Negative for activity change, appetite change and fever  HENT: Negative for congestion, nosebleeds and trouble swallowing  Eyes: Negative for itching  Respiratory: Negative for cough and chest tightness  Cardiovascular: Negative for chest pain and palpitations  Gastrointestinal: Negative for abdominal pain, constipation, diarrhea and nausea  Endocrine: Negative for cold intolerance  Genitourinary: Negative for frequency  Musculoskeletal: Negative for gait problem and joint swelling  Skin: Negative for rash  Allergic/Immunologic: Negative for immunocompromised state  Neurological: Negative for dizziness, tremors, seizures, syncope and headaches  Psychiatric/Behavioral: Negative for hallucinations and suicidal ideas           Objective:      /76 (BP Location: Left arm, Patient Position: Sitting, Cuff Size: Standard)   Pulse 95   Resp 16   Ht 5' 10" (1 778 m)   Wt 72 6 kg (160 lb)   SpO2 96%   BMI 22 96 kg/m²     No visits with results within 2 Week(s) from this visit  Latest known visit with results is:   Appointment on 03/22/2021   Component Date Value    Hemoglobin A1C 03/22/2021 5 9*    EAG 03/22/2021 123     Iron Saturation 03/22/2021 33     TIBC 03/22/2021 330     Iron 03/22/2021 109     Ferritin 03/22/2021 47           Physical Exam  Vitals and nursing note reviewed  Constitutional:       General: He is not in acute distress  Appearance: He is well-developed  HENT:      Head: Normocephalic and atraumatic  Cardiovascular:      Rate and Rhythm: Normal rate and regular rhythm  Heart sounds: Normal heart sounds  No murmur heard  Pulmonary:      Effort: Pulmonary effort is normal       Breath sounds: Normal breath sounds  No wheezing or rales  Abdominal:      General: Bowel sounds are normal  There is no distension  Palpations: Abdomen is soft  Tenderness: There is no abdominal tenderness  There is no guarding or rebound  Musculoskeletal:         General: No tenderness  Normal range of motion  Cervical back: Normal range of motion and neck supple  Lymphadenopathy:      Cervical: No cervical adenopathy  Skin:     General: Skin is warm and dry  Capillary Refill: Capillary refill takes less than 2 seconds  Findings: No rash  Neurological:      Mental Status: He is alert and oriented to person, place, and time  Cranial Nerves: No cranial nerve deficit  Sensory: No sensory deficit  Motor: No abnormal muscle tone  Psychiatric:         Behavior: Behavior normal          Thought Content:  Thought content normal          Judgment: Judgment normal              Teresa Cohn MD  [place] [id number]

## 2021-09-27 ENCOUNTER — TELEPHONE (OUTPATIENT)
Dept: FAMILY MEDICINE CLINIC | Facility: CLINIC | Age: 86
End: 2021-09-27

## 2021-09-27 NOTE — TELEPHONE ENCOUNTER
----- Message from Macy Almanzar MD sent at 9/27/2021  8:24 AM EDT -----  Cont with the iron  And stay hydrated   That's about it !
Left detailed message; mailed results with notes attached
Brian Maya

## 2022-02-23 ENCOUNTER — HOSPITAL ENCOUNTER (OUTPATIENT)
Dept: RADIOLOGY | Facility: HOSPITAL | Age: 87
Discharge: HOME | End: 2022-02-23
Payer: MEDICARE

## 2022-02-23 ENCOUNTER — OFFICE VISIT (OUTPATIENT)
Dept: OBGYN CLINIC | Facility: CLINIC | Age: 87
End: 2022-02-23
Payer: MEDICARE

## 2022-02-23 VITALS — WEIGHT: 153.2 LBS | HEIGHT: 70 IN | BODY MASS INDEX: 21.93 KG/M2

## 2022-02-23 DIAGNOSIS — M25.462 EFFUSION OF LEFT KNEE: ICD-10-CM

## 2022-02-23 DIAGNOSIS — M25.562 ACUTE PAIN OF LEFT KNEE: Primary | ICD-10-CM

## 2022-02-23 DIAGNOSIS — M25.562 LEFT KNEE PAIN, UNSPECIFIED CHRONICITY: ICD-10-CM

## 2022-02-23 LAB
APPEARANCE FLD: ABNORMAL
COLOR FLD: ABNORMAL
CRYSTALS SNV QL MICRO: NORMAL
HISTIOCYTES NFR SNV MANUAL: 1 %
LYMPHOCYTES # SNV MANUAL: 8 %
MONOCYTES NFR SNV MANUAL: 24 %
NEUTROPHILS NFR SNV MANUAL: 67 %
SITE: ABNORMAL
TOTAL CELLS COUNTED SPEC: 100
WBC # FLD MANUAL: ABNORMAL /UL (ref 0–200)

## 2022-02-23 PROCEDURE — 87070 CULTURE OTHR SPECIMN AEROBIC: CPT

## 2022-02-23 PROCEDURE — 89051 BODY FLUID CELL COUNT: CPT

## 2022-02-23 PROCEDURE — 89060 EXAM SYNOVIAL FLUID CRYSTALS: CPT

## 2022-02-23 PROCEDURE — 73562 X-RAY EXAM OF KNEE 3: CPT

## 2022-02-23 PROCEDURE — 20610 DRAIN/INJ JOINT/BURSA W/O US: CPT | Performed by: PHYSICIAN ASSISTANT

## 2022-02-23 PROCEDURE — 99203 OFFICE O/P NEW LOW 30 MIN: CPT | Performed by: PHYSICIAN ASSISTANT

## 2022-02-23 PROCEDURE — 87205 SMEAR GRAM STAIN: CPT

## 2022-02-23 PROCEDURE — 87476 LYME DIS DNA AMP PROBE: CPT

## 2022-02-23 NOTE — PROGRESS NOTES
Assessment/Plan   Diagnoses and all orders for this visit:    Acute pain of left knee    Very Large Effusion of left knee  - Unclear cause  The fluid was cloudy but not purulent  - Fluid sent to lab for culture, gram stain, cell count, crystals, and lyme  - I did not inject steroid because the fluid was cloudy  - Continue using cane for balance  - Follow up with Dr Elissa Rankin, or Amanda Noble in 1-2 weeks          Subjective   Patient ID: Beatriz Moore is a 81 y o  male  Vitals:     82yo male comes in for an evaluation of his left knee  He has been having pain and swelling in the knee for about 1-2 weeks with no specific injury  He has noticed stiffness  No fevers or chills  No history of gout or Lyme  The pain is dull in character, mild in severity, pain does not radiate and is not associated with numbness  The following portions of the patient's history were reviewed and updated as appropriate: allergies, current medications, past family history, past medical history, past social history, past surgical history and problem list     Review of Systems  Ortho Exam  Past Medical History:   Diagnosis Date    Cancer ([facility] [address] )     Lesion left calf removed    Hypertension     Pulmonary hypertension ([facility] [address] )      Past Surgical History:   Procedure Laterality Date    APPENDECTOMY      CATARACT EXTRACTION, BILATERAL      COLONOSCOPY  06/2018     Family History   Problem Relation Age of Onset    Heart disease Father     Sudden death Father 67    COPD Neg Hx     Asthma Neg Hx     Lung cancer Neg Hx      Social History     Occupational History    Occupation: RETIRED   Tobacco Use    Smoking status: Never Smoker    Smokeless tobacco: Never Used   Vaping Use    Vaping Use: Never used   Substance and Sexual Activity    Alcohol use: Not on file     Comment: NOne    Drug use: Not on file     Comment: NONE    Sexual activity: Not on file       Review of Systems   Constitutional: Negative      HENT: Negative  Eyes: Negative  Respiratory: Negative  Cardiovascular: Negative  Gastrointestinal: Negative  Endocrine: Negative  Genitourinary: Negative  Musculoskeletal: As below      Allergic/Immunologic: Negative  Neurological: Negative  Hematological: Negative  Psychiatric/Behavioral: Negative  Objective   Physical Exam      · Constitutional: Awake, Alert, Oriented  · Eyes: EOMI  · Psych: Mood and affect appropriate  · Heart: regular rate   · Lungs: No audible wheezing  · Abdomen: No guarding  · Lymph: no lymphedema   left Knee:  - Appearance   Swelling: significant, no discoloration, no deformity, no ecchymosis and no erythema  - Effusion   Very large  - Palpation   No tenderness about the medial / lateral joint line, patella, patellar tendon, MCL, LCL, hamstrings, or medial / lateral tibial plateau   - ROM   Extension: 15 and Flexion: 70 (improved to 110 after aspiration)  - Special Tests   Anterior Drawer Test negative, Posterior Drawer Test negative, Valgus Stress Test negative, Varus Stress Test negative and Patellar apprehension negative  - Motor   normal 5/5 in all planes  - NVI distally    I have personally reviewed pertinent films in PACS and my interpretation is No acute displaced fracture  No significant osteoarthritis  Ji Marsh on xray  Large joint arthrocentesis: L knee  Universal Protocol:  Consent: Verbal consent obtained  Risks and benefits: risks, benefits and alternatives were discussed  Consent given by: patient  Time out: Immediately prior to procedure a "time out" was called to verify the correct patient, procedure, equipment, support staff and site/side marked as required  Timeout called at: 2/23/2022 9:04 AM   Patient understanding: patient states understanding of the procedure being performed  Site marked: the operative site was marked  Radiology Images displayed and confirmed   If images not available, report reviewed: imaging studies available  Patient identity confirmed: verbally with patient    Supporting Documentation  Indications: pain   Procedure Details  Location: knee - L knee  Needle size: 22 G  Ultrasound guidance: no  Approach: lateral    Aspirate amount: 120 mL  Aspirate: yellow, cloudy and blood-tinged  Analysis: fluid sample sent for laboratory analysis    Patient tolerance: patient tolerated the procedure well with no immediate complications  Dressing:  Sterile dressing applied    I opted not to inject steroid because the fluid was cloudy

## 2022-02-27 LAB
BACTERIA SPEC BFLD CULT: NORMAL
GRAM STN SPEC: NORMAL
GRAM STN SPEC: NORMAL

## 2022-03-01 ENCOUNTER — TELEPHONE (OUTPATIENT)
Dept: OBGYN CLINIC | Facility: HOSPITAL | Age: 87
End: 2022-03-01

## 2022-03-01 NOTE — TELEPHONE ENCOUNTER
Kaelyn Osorio no longer sees patients out of the [facility] [place], she is now in the [place] office, please send to correct location

## 2022-03-01 NOTE — TELEPHONE ENCOUNTER
Patient daughter called again, asking for pain med or anti inflammatory please be prescribed  Checked for sooner appt with Dr Mónica Keith but nothing available yet  Preferred pharmacy is [facility] [address] in [place]       [facility] # [phone]

## 2022-03-01 NOTE — TELEPHONE ENCOUNTER
Patient would like to know if we can prescribe something for the pain until his appt with the doc  Patient would like it sent to [facility] on [address]  Marizol Lagos can be reached at [phone]   Thank you

## 2022-03-03 ENCOUNTER — TELEPHONE (OUTPATIENT)
Dept: OBGYN CLINIC | Facility: CLINIC | Age: 87
End: 2022-03-03

## 2022-03-03 LAB — B BURGDOR DNA SPEC QL NAA+PROBE: POSITIVE

## 2022-03-03 NOTE — TELEPHONE ENCOUNTER
Synovial fluid test came back + for Lyme disease  I sent an rx for doxycycline to his pharmacy  I called Mr Matos Lynda and left voicemails on his cell and home numbers  He should f/u with his PCP

## 2022-03-07 NOTE — TELEPHONE ENCOUNTER
Patient's daughter is calling in to check if the patient should continue to take his Meloxicam with the antibiotics  Also  Should he still need to see Dr Mayuri Goldsmith or follow up with his PCP  Patient can be reached at [phone]   Thank you

## 2022-03-08 NOTE — TELEPHONE ENCOUNTER
Patient's daughter called back, relayed the message to her from Manuela Lyon  She verbally understood the message

## 2022-03-14 ENCOUNTER — RA CDI HCC (OUTPATIENT)
Dept: OTHER | Facility: HOSPITAL | Age: 87
End: 2022-03-14

## 2022-03-14 ENCOUNTER — TELEPHONE (OUTPATIENT)
Dept: OBGYN CLINIC | Facility: OTHER | Age: 87
End: 2022-03-14

## 2022-03-14 NOTE — TELEPHONE ENCOUNTER
Patients daughter Susan Salinas called in, she would like to as    Has Lyme and has been taking Doxycycline  She has now noticed face swelling & upper Lip swelling  He is scheduled to see Dr Jacklyn Goncalves Thursday      C/b # [phone]

## 2022-03-14 NOTE — PROGRESS NOTES
[address]  coding opportunities       Chart reviewed, no opportunity found: CHART REVIEWED, NO OPPORTUNITY FOUND        Patients Insurance     Medicare Insurance: Medicare

## 2022-03-17 ENCOUNTER — OFFICE VISIT (OUTPATIENT)
Dept: OBGYN CLINIC | Facility: CLINIC | Age: 87
End: 2022-03-17
Payer: MEDICARE

## 2022-03-17 VITALS
HEIGHT: 70 IN | SYSTOLIC BLOOD PRESSURE: 132 MMHG | HEART RATE: 65 BPM | WEIGHT: 153 LBS | DIASTOLIC BLOOD PRESSURE: 73 MMHG | BODY MASS INDEX: 21.9 KG/M2

## 2022-03-17 DIAGNOSIS — A69.20 LYME DISEASE: Primary | ICD-10-CM

## 2022-03-17 PROCEDURE — 99213 OFFICE O/P EST LOW 20 MIN: CPT | Performed by: ORTHOPAEDIC SURGERY

## 2022-03-17 NOTE — PROGRESS NOTES
Assessment/Plan:  1  Lyme disease         Scribe Attestation    I,:  Enma Barrera MA am acting as a scribe while in the presence of the attending physician :       I,:  Roxana Leon DO personally performed the services described in this documentation    as scribed in my presence  :             61-year-old male with left knee effusion and lyme disease  The fluid analysis was reviewed  Patient has been started on Doxycycline  I discussed with Divine Portillo and his daughter that lyme disease is not an orthopedic issue  Patient was instructed he needs to follow up with his PCP and possibly infectious disease  I explained, lyme disease is a reportable disease  Patient was advised to take a probiotic daily and culture positive yogurt to avoid GI upset while on antibiotics  Patient does have an appointment tomorrow with his PCP  We did discuss a repeat aspiration in the office today  Patient declined  He was advised to discontinue Meloxicam  He may follow up with me as needed  Subjective:   Tova Serrato is a 81 y o  male who presents to the office today as a referral from Keanu Kamara for evaluation of left knee pain  Patient states he noticed left knee pain and swelling appx 3 weeks ago  He denies any known injury or trauma  Patient was evaluated by Keanu Kamara on 2/23/22 and underwent a left knee aspirated and the fluid was sent for fluid analysis  The fluid analysis was positive for Lyme disease  Patient was started on Doxycycline by Keanu Kamara  Patient states he started this antibiotic appx 2 weeks ago  Patient's daughter states he did have one day of swelling to his upper lip  He states his pain and swelling has been improving  He notes intermittent pain which he states is increased with certain movement  Patient states he no longer is using a cane for ambulation  He denies prior surgery on his knee  Review of Systems   Constitutional: Negative for chills and fever  HENT: Negative for drooling and sneezing  Eyes: Negative for redness  Respiratory: Negative for cough and wheezing  Gastrointestinal: Negative for nausea and vomiting  Musculoskeletal: Positive for joint swelling  Negative for arthralgias and myalgias  Neurological: Negative for weakness and numbness  Psychiatric/Behavioral: Negative for behavioral problems  The patient is not nervous/anxious            Past Medical History:   Diagnosis Date    Cancer [facility])     Lesion left calf removed    Hypertension     Pulmonary hypertension ([address] )        Past Surgical History:   Procedure Laterality Date    APPENDECTOMY      CATARACT EXTRACTION, BILATERAL      COLONOSCOPY  06/2018       Family History   Problem Relation Age of Onset    Heart disease Father     Sudden death Father 67    COPD Neg Hx     Asthma Neg Hx     Lung cancer Neg Hx        Social History     Occupational History    Occupation: RETIRED   Tobacco Use    Smoking status: Never Smoker    Smokeless tobacco: Never Used   Vaping Use    Vaping Use: Never used   Substance and Sexual Activity    Alcohol use: Not on file     Comment: NOne    Drug use: Not on file     Comment: NONE    Sexual activity: Not on file         Current Outpatient Medications:     Ascorbic Acid, Vitamin C, (VITAMIN C) 100 MG tablet, Take by mouth daily, Disp: , Rfl:     aspirin 325 mg tablet, Take 325 mg by mouth, Disp: , Rfl:     atorvastatin (LIPITOR) 40 mg tablet, Take 1 tablet (40 mg total) by mouth daily, Disp: 90 tablet, Rfl: 1    Coenzyme Q10 10 MG capsule, Take 10 mg by mouth daily, Disp: , Rfl:     Docosahexaenoic Acid--270 MG CAPS, Take by mouth daily, Disp: , Rfl:     doxycycline (ADOXA) 100 MG tablet, Take 1 tablet (100 mg total) by mouth 2 (two) times a day for 28 days, Disp: 56 tablet, Rfl: 0    Glucosamine-Chondroit-Vit C-Mn (Glucosamine Chondr 1500 Complx) CAPS, Take by mouth daily, Disp: , Rfl:     iron polysaccharides (Ferrex 150) 150 mg capsule, Take 1 capsule (150 mg total) by mouth 2 (two) times a day, Disp: 180 capsule, Rfl: 1    lisinopril (ZESTRIL) 2 5 mg tablet, TAKE 1 TABLET BY MOUTH EVERY DAY, Disp: , Rfl:     Lutein 10 MG TABS, Take by mouth daily, Disp: , Rfl:     meloxicam (Mobic) 15 mg tablet, Take 1 tablet (15 mg total) by mouth daily, Disp: 28 tablet, Rfl: 0    multivitamin (THERAGRAN) TABS, Take 1 tablet by mouth daily, Disp: , Rfl:     SF 5000 Plus 1 1 % CREA, BRUSH FOR 2 MINUTES ONCE DAILY AT BEDTIME  EXPECTORATE  NOTHING BY MOUTH 30 MINUTES, Disp: , Rfl:     vitamin E, tocopherol, 200 units capsule, Take 200 Units by mouth daily, Disp: , Rfl:     No Known Allergies    Objective:  Vitals:    03/17/22 0839   BP: 132/73   Pulse: 65       Left Knee Exam     Range of Motion   The patient has normal left knee ROM  Extension: -5   Flexion: 120     Other   Effusion: effusion present    Comments:  No erythema  No warmth with palpation  No ecchymosis   Mid line scar incision from falling on a rake as a child           Observations   Left Knee   Positive for effusion  Physical Exam  Constitutional:       Appearance: He is well-developed  HENT:      Head: Normocephalic and atraumatic  Eyes:      General:         Right eye: No discharge  Left eye: No discharge  Conjunctiva/sclera: Conjunctivae normal    Cardiovascular:      Rate and Rhythm: Normal rate  Pulmonary:      Effort: Pulmonary effort is normal  No respiratory distress  Musculoskeletal:      Cervical back: Normal range of motion and neck supple  Left knee: Effusion present  Comments: As noted in HPI   Skin:     General: Skin is warm and dry  Neurological:      Mental Status: He is alert and oriented to person, place, and time  Psychiatric:         Behavior: Behavior normal          Thought Content:  Thought content normal          Judgment: Judgment normal          I have personally reviewed pertinent films in PACS and my interpretation is as follows:X-ray left knee performed on 2/23/22 demonstrates no osseous abnormalities

## 2022-03-18 ENCOUNTER — OFFICE VISIT (OUTPATIENT)
Dept: FAMILY MEDICINE CLINIC | Facility: CLINIC | Age: 87
End: 2022-03-18
Payer: MEDICARE

## 2022-03-18 VITALS
HEART RATE: 100 BPM | WEIGHT: 154 LBS | HEIGHT: 70 IN | SYSTOLIC BLOOD PRESSURE: 120 MMHG | RESPIRATION RATE: 16 BRPM | OXYGEN SATURATION: 99 % | DIASTOLIC BLOOD PRESSURE: 82 MMHG | BODY MASS INDEX: 22.05 KG/M2

## 2022-03-18 DIAGNOSIS — E78.2 MIXED HYPERLIPIDEMIA: ICD-10-CM

## 2022-03-18 DIAGNOSIS — E61.1 IRON DEFICIENCY: ICD-10-CM

## 2022-03-18 DIAGNOSIS — I10 PRIMARY HYPERTENSION: ICD-10-CM

## 2022-03-18 DIAGNOSIS — I27.20 PULMONARY HYPERTENSION (HCC): ICD-10-CM

## 2022-03-18 DIAGNOSIS — I34.1 MVP (MITRAL VALVE PROLAPSE): ICD-10-CM

## 2022-03-18 DIAGNOSIS — A69.23 LYME ARTHRITIS (HCC): ICD-10-CM

## 2022-03-18 DIAGNOSIS — D86.9 SARCOIDOSIS: ICD-10-CM

## 2022-03-18 DIAGNOSIS — I25.10 CORONARY ARTERY DISEASE INVOLVING NATIVE CORONARY ARTERY OF NATIVE HEART WITHOUT ANGINA PECTORIS: ICD-10-CM

## 2022-03-18 DIAGNOSIS — Z00.00 WELL ADULT EXAM: Primary | ICD-10-CM

## 2022-03-18 PROCEDURE — G0438 PPPS, INITIAL VISIT: HCPCS | Performed by: FAMILY MEDICINE

## 2022-03-18 PROCEDURE — 99214 OFFICE O/P EST MOD 30 MIN: CPT | Performed by: FAMILY MEDICINE

## 2022-03-18 PROCEDURE — 1123F ACP DISCUSS/DSCN MKR DOCD: CPT | Performed by: FAMILY MEDICINE

## 2022-03-18 NOTE — PROGRESS NOTES
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    Pulmonary hypertension (HCC)    Hypertension    MVP (mitral valve prolapse)       Other    Iron deficiency    Sarcoidosis    Hyperlipidemia      Other Visit Diagnoses     Well adult exam    -  Primary    Lyme arthritis ([facility] [address] )              Depression Screening and Follow-up Plan: Patient was screened for depression during today's encounter  They screened negative with a PHQ-2 score of 0  Preventive health issues were discussed with patient, and age appropriate screening tests were ordered as noted in patient's After Visit Summary  Personalized health advice and appropriate referrals for health education or preventive services given if needed, as noted in patient's After Visit Summary  History of Present Illness:     Patient presents for Medicare Annual Wellness visit    Patient Care Team:  Ricarda Munoz MD as PCP - General (Family Medicine)     Problem List:     Patient Active Problem List   Diagnosis    Prediabetes    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 pectoris    Iron deficiency    Pulmonary hypertension ([facility] [address] )    Hypertension    Sarcoidosis    Pulmonary nodules    MVP (mitral valve prolapse)    Hyperlipidemia      Past Medical and Surgical History:     Past Medical History:   Diagnosis Date    Cancer ([facility] [address] )     Lesion left calf removed    Hypertension     Pulmonary hypertension ([facility] [address] )      Past Surgical History:   Procedure Laterality Date    APPENDECTOMY      CATARACT EXTRACTION, BILATERAL      COLONOSCOPY  06/2018      Family History:     Family History   Problem Relation Age of Onset    Heart disease Father     Sudden death Father 67    COPD Neg Hx     Asthma Neg Hx     Lung cancer Neg Hx       Social History:     Social History     Socioeconomic History    Marital status:   Spouse name: None    Number of children: 2    Years of education: 15    Highest education level: None   Occupational History    Occupation: RETIRED   Tobacco Use    Smoking status: Never Smoker    Smokeless tobacco: Never Used   Vaping Use    Vaping Use: Never used   Substance and Sexual Activity    Alcohol use: None     Comment: NOne    Drug use: None     Comment: NONE    Sexual activity: None   Other Topics Concern    None   Social History Narrative    Most recent tobacco use screenin2020    Do you currently or have you served in the [employer]: No    Were you activated, into active duty, as a member of the [employer] or as a Reservist: No    Advance directive: No    Alcohol intake: None    Advance directive - Provider has reviewed directives and consents to follow them (insert provider name with any objections in notes field): No    Caffeine intake: None    Illicit drugs: none    Occupation: retired    Exercise level: Moderate    Single or multi-level home/work: single level home    Live alone or with others: alone    Chewing tobacco: none    Marital status:      Number of children: 2    Diet: Regular    Sexual orientation: Heterosexual    Smoke alarm in home: Yes    General stress level: Low    Sunscreen used routinely: No    Education: 12    Guns present in home: No    Presence of domestic violence: No    Passive smoke exposure: Yes    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 ; printing    Asbestos exposure: No    TB exposure: No    Environmental exposure: No    Animal exposure: No    Legally blind in one or both eyes: No     Social Determinants of Health     Financial Resource Strain: Not on file   Food Insecurity: Not on file   Transportation Needs: Not on file   Physical Activity: Not on file   Stress: Not on file   Social Connections: Not on file   Intimate Partner Violence: Not on file   Housing Stability: Not on file      Medications and Allergies:     Current Outpatient Medications   Medication Sig Dispense Refill    Ascorbic Acid, Vitamin C, (VITAMIN C) 100 MG tablet Take 500 mg by mouth daily        aspirin 325 mg tablet Take 325 mg by mouth every other day Take 1/2 of pill every other day       atorvastatin (LIPITOR) 40 mg tablet Take 1 tablet (40 mg total) by mouth daily 90 tablet 1    Coenzyme Q10 10 MG capsule Take 10 mg by mouth daily      Docosahexaenoic Acid--270 MG CAPS Take by mouth daily      doxycycline (ADOXA) 100 MG tablet Take 1 tablet (100 mg total) by mouth 2 (two) times a day for 28 days 56 tablet 0    Glucosamine-Chondroit-Vit C-Mn (Glucosamine Chondr 1500 Complx) CAPS Take by mouth daily      lisinopril (ZESTRIL) 2 5 mg tablet TAKE 1 TABLET BY MOUTH EVERY DAY      Lutein 10 MG TABS Take by mouth daily      multivitamin (THERAGRAN) TABS Take 1 tablet by mouth daily      SF 5000 Plus 1 1 % CREA BRUSH FOR 2 MINUTES ONCE DAILY AT BEDTIME  EXPECTORATE  NOTHING BY MOUTH 30 MINUTES      vitamin E, tocopherol, 200 units capsule Take 200 Units by mouth daily      iron polysaccharides (Ferrex 150) 150 mg capsule Take 1 capsule (150 mg total) by mouth 2 (two) times a day (Patient not taking: Reported on 3/18/2022 ) 180 capsule 1     No current facility-administered medications for this visit  No Known Allergies   Immunizations:     Immunization History   Administered Date(s) Administered    COVID-19 MODERNA VACC 0 5 ML IM 03/02/2021, 03/30/2021    DTaP 08/11/2014    INFLUENZA 09/28/2018    Influenza, high dose seasonal 0 7 mL 11/06/2020, 10/24/2021    Pneumococcal Conjugate 13-Valent 03/23/2018    Pneumococcal Polysaccharide PPV23 08/11/2014    Tdap 08/11/2014      Health Maintenance: There are no preventive care reminders to display for this patient        Topic Date Due    COVID-19 Vaccine (3 - Booster for Moderna series) 08/30/2021      Medicare Health Risk Assessment:     /82 (BP Location: Left arm, Patient Position: Sitting, Cuff Size: Large)   Pulse 100   Resp 16   Ht 5' 10" (1 778 m)   Wt 69 9 kg (154 lb)   SpO2 99%   BMI 22 10 kg/m²      Giacomo Thompson is here for his Subsequent Wellness visit  Last Medicare Wellness visit information reviewed, patient interviewed and updates made to the record today  Health Risk Assessment:   Patient rates overall health as very good  Patient feels that their physical health rating is same  Patient is very satisfied with their life  Eyesight was rated as same  Hearing was rated as same  Patient feels that their emotional and mental health rating is same  Patients states they are never, rarely angry  Patient states they are never, rarely unusually tired/fatigued  Pain experienced in the last 7 days has been none  Patient states that he has experienced no weight loss or gain in last 6 months  Depression Screening:   PHQ-2 Score: 0      Fall Risk Screening: In the past year, patient has experienced: no history of falling in past year      Home Safety:  Patient does not have trouble with stairs inside or outside of their home  Patient has working smoke alarms and has working carbon monoxide detector  Home safety hazards include: none  Nutrition:   Current diet is Regular  Medications:   Patient is currently taking over-the-counter supplements  OTC medications include: see medication list  Patient is able to manage medications  Activities of Daily Living (ADLs)/Instrumental Activities of Daily Living (IADLs):   Walk and transfer into and out of bed and chair?: Yes  Dress and groom yourself?: Yes    Bathe or shower yourself?: Yes    Feed yourself?  Yes  Do your laundry/housekeeping?: Yes  Manage your money, pay your bills and track your expenses?: Yes  Make your own meals?: Yes    Do your own shopping?: Yes    Previous Hospitalizations:   Any hospitalizations or ED visits within the last 12 months?: No      Advance Care Planning:   Living will: No    Durable POA for healthcare: No    Advanced directive: No      Cognitive Screening:   Provider or family/friend/caregiver concerned regarding cognition?: No    PREVENTIVE SCREENINGS      Cardiovascular Screening:    General: Screening Not Indicated and History Lipid Disorder      Diabetes Screening:     General: Screening Current      Colorectal Cancer Screening:     General: Screening Not Indicated      Prostate Cancer Screening:    General: Screening Not Indicated      Osteoporosis Screening:    General: Screening Not Indicated    Due for: Bone Density Ultrasound      Abdominal Aortic Aneurysm (AAA) Screening:        General: Screening Not Indicated      Lung Cancer Screening:     General: Screening Not Indicated      Hepatitis C Screening:    General: Screening Current    Hep C Screening Accepted: No     Screening, Brief Intervention, and Referral to Treatment (SBIRT)    Screening  Typical number of drinks in a day: 0  Typical number of drinks in a week: 0  Interpretation: Low risk drinking behavior  Single Item Drug Screening:  How often have you used an illegal drug (including marijuana) or a prescription medication for non-medical reasons in the past year? never    Single Item Drug Screen Score: 0  Interpretation: Negative screen for possible drug use disorder    Brief Intervention  Alcohol & drug use screenings were reviewed  No concerns regarding substance use disorder identified         Flaquito Garnett MD

## 2022-03-18 NOTE — PATIENT INSTRUCTIONS
Medicare Preventive Visit Patient Instructions  Thank you for completing your Welcome to Medicare Visit or Medicare Annual Wellness Visit today  Your next wellness visit will be due in one year (3/19/2023)  The screening/preventive services that you may require over the next 5-10 years are detailed below  Some tests may not apply to you based off risk factors and/or age  Screening tests ordered at today's visit but not completed yet may show as past due  Also, please note that scanned in results may not display below  Preventive Screenings:  Service Recommendations Previous Testing/Comments   Colorectal Cancer Screening  · Colonoscopy    · Fecal Occult Blood Test (FOBT)/Fecal Immunochemical Test (FIT)  · Fecal DNA/Cologuard Test  · Flexible Sigmoidoscopy Age: 54-65 years old   Colonoscopy: every 10 years (May be performed more frequently if at higher risk)  OR  FOBT/FIT: every 1 year  OR  Cologuard: every 3 years  OR  Sigmoidoscopy: every 5 years  Screening may be recommended earlier than age 48 if at higher risk for colorectal cancer  Also, an individualized decision between you and your healthcare provider will decide whether screening between the ages of 74-80 would be appropriate   Colonoscopy: Not on file  FOBT/FIT: Not on file  Cologuard: Not on file  Sigmoidoscopy: Not on file    Screening Not Indicated     Prostate Cancer Screening Individualized decision between patient and health care provider in men between ages of 53-78   Medicare will cover every 12 months beginning on the day after your 50th birthday PSA: No results in last 5 years     Screening Not Indicated     Hepatitis C Screening Once for adults born between [facility]  More frequently in patients at high risk for Hepatitis C Hep C Antibody: Not on file        Diabetes Screening 1-2 times per year if you're at risk for diabetes or have pre-diabetes Fasting glucose: 96 mg/dL   A1C: 6 1 %    Screening Current   Cholesterol Screening Once every 5 years if you don't have a lipid disorder  May order more often based on risk factors  Lipid panel: 08/11/2020    Screening Not Indicated  History Lipid Disorder      Other Preventive Screenings Covered by Medicare:  1  Abdominal Aortic Aneurysm (AAA) Screening: covered once if your at risk  You're considered to be at risk if you have a family history of AAA or a male between the age of 73-68 who smoking at least 100 cigarettes in your lifetime  2  Lung Cancer Screening: covers low dose CT scan once per year if you meet all of the following conditions: (1) Age 50-69; (2) No signs or symptoms of lung cancer; (3) Current smoker or have quit smoking within the last 15 years; (4) You have a tobacco smoking history of at least 30 pack years (packs per day x number of years you smoked); (5) You get a written order from a healthcare provider  3  Glaucoma Screening: covered annually if you're considered high risk: (1) You have diabetes OR (2) Family history of glaucoma OR (3)  aged 48 and older OR (3)  American aged 72 and older  3  Osteoporosis Screening: covered every 2 years if you meet one of the following conditions: (1) Have a vertebral abnormality; (2) On glucocorticoid therapy for more than 3 months; (3) Have primary hyperparathyroidism; (4) On osteoporosis medications and need to assess response to drug therapy  5  HIV Screening: covered annually if you're between the age of 12-76  Also covered annually if you are younger than 13 and older than 72 with risk factors for HIV infection  For pregnant patients, it is covered up to 3 times per pregnancy      Immunizations:  Immunization Recommendations   Influenza Vaccine Annual influenza vaccination during flu season is recommended for all persons aged >= 6 months who do not have contraindications   Pneumococcal Vaccine (Prevnar and Pneumovax)  * Prevnar = PCV13  * Pneumovax = PPSV23 Adults 25-60 years old: 1-3 doses may be recommended based on certain risk factors  Adults 72 years old: Prevnar (PCV13) vaccine recommended followed by Pneumovax (PPSV23) vaccine  If already received PPSV23 since turning 65, then PCV13 recommended at least one year after PPSV23 dose  Hepatitis B Vaccine 3 dose series if at intermediate or high risk (ex: diabetes, end stage renal disease, liver disease)   Tetanus (Td) Vaccine - COST NOT COVERED BY MEDICARE PART B Following completion of primary series, a booster dose should be given every 10 years to maintain immunity against tetanus  Td may also be given as tetanus wound prophylaxis  Tdap Vaccine - COST NOT COVERED BY MEDICARE PART B Recommended at least once for all adults  For pregnant patients, recommended with each pregnancy  Shingles Vaccine (Shingrix) - COST NOT COVERED BY MEDICARE PART B  2 shot series recommended in those aged 48 and above     Health Maintenance Due:  There are no preventive care reminders to display for this patient  Immunizations Due:      Topic Date Due    COVID-19 Vaccine (3 - Booster for Moderna series) 08/30/2021     Advance Directives   What are advance directives? Advance directives are legal documents that state your wishes and plans for medical care  These plans are made ahead of time in case you lose your ability to make decisions for yourself  Advance directives can apply to any medical decision, such as the treatments you want, and if you want to donate organs  What are the types of advance directives? There are many types of advance directives, and each state has rules about how to use them  You may choose a combination of any of the following:  · Living will: This is a written record of the treatment you want  You can also choose which treatments you do not want, which to limit, and which to stop at a certain time  This includes surgery, medicine, IV fluid, and tube feedings  · Durable power of  for healthcare [facility]):   This is a written record that states who you want to make healthcare choices for you when you are unable to make them for yourself  This person, called a proxy, is usually a family member or a friend  You may choose more than 1 proxy  · Do not resuscitate (DNR) order:  A DNR order is used in case your heart stops beating or you stop breathing  It is a request not to have certain forms of treatment, such as CPR  A DNR order may be included in other types of advance directives  · Medical directive: This covers the care that you want if you are in a coma, near death, or unable to make decisions for yourself  You can list the treatments you want for each condition  Treatment may include pain medicine, surgery, blood transfusions, dialysis, IV or tube feedings, and a ventilator (breathing machine)  · Values history: This document has questions about your views, beliefs, and how you feel and think about life  This information can help others choose the care that you would choose  Why are advance directives important? An advance directive helps you control your care  Although spoken wishes may be used, it is better to have your wishes written down  Spoken wishes can be misunderstood, or not followed  Treatments may be given even if you do not want them  An advance directive may make it easier for your family to make difficult choices about your care  © Copyright [employer] 2018 Information is for End User's use only and may not be sold, redistributed or otherwise used for commercial purposes   All illustrations and images included in CareNotes® are the copyrighted property of [employer]  or [address] [place] [address] [employer]

## 2022-03-18 NOTE — PROGRESS NOTES
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 given the fact that this is an March and it would be odd for him to have any sort of tick bite and the last winter months  He will continue to follow with the cardiologist for the CAD mitral valve prolapse pulmonary hypertension  I agree with taking doxycycline for total of 1 month  We did go over the dietary restrictions while taking that medication  He will continue to exercise as tolerated when the weather gets better  He currently is not taking any iron and will resume that after the doxycycline is finished to      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[address] )    2  Pulmonary hypertension ([address] )    3  Sarcoidosis    4  Iron deficiency    5  Mixed hyperlipidemia    6  MVP (mitral valve prolapse)    7  Primary hypertension    8  Coronary artery disease involving native coronary artery of native heart without angina pectoris         Subjective:      Patient ID: Cheli Pierce is a 81 y o  male  HPI   About 1 month   left knee pain and swelling   Saw ortho tapped it   Came back lyme   sherlyn gave 28d of doxy     kia sugested CTS brace at bedtime he never did   Doesn't bother him lately       The following portions of the patient's history were reviewed and updated as appropriate: allergies, current medications, past family history, past medical history, past social history, past surgical history and problem list     Review of Systems   Constitutional: Negative for activity change, appetite change and fever  HENT: Negative for congestion, nosebleeds and trouble swallowing  Eyes: Negative for itching  Respiratory: Negative for cough and chest tightness      Cardiovascular: Negative for chest pain and palpitations  Gastrointestinal: Negative for abdominal pain, constipation, diarrhea and nausea  Endocrine: Negative for cold intolerance  Genitourinary: Negative for frequency  Musculoskeletal: Negative for gait problem and joint swelling  Skin: Negative for rash  Allergic/Immunologic: Negative for immunocompromised state  Neurological: Negative for dizziness, tremors, seizures, syncope and headaches  Psychiatric/Behavioral: Negative for hallucinations and suicidal ideas  Objective:      /82 (BP Location: Left arm, Patient Position: Sitting, Cuff Size: Large)   Pulse 100   Resp 16   Ht 5' 10" (1 778 m)   Wt 69 9 kg (154 lb)   SpO2 99%   BMI 22 10 kg/m²     No visits with results within 2 Week(s) from this visit  Latest known visit with results is:   Hospital Outpatient Visit on 02/23/2022   Component Date Value    Body Fluid Culture, Ster* 02/23/2022 Growth in Broth culture only Diphtheroids     Gram Stain Result 02/23/2022 1+ Polys     Gram Stain Result 02/23/2022 No organisms seen     Site 02/23/2022 left knee     Color, Fluid 02/23/2022 Dk Yellow     Clarity, Fluid 02/23/2022 Cloudy*    WBC, Fluid 02/23/2022 16,797*    Crystals, Synovial Fluid 02/23/2022 No Crystals Seen     Lyme Disease(B burgdorfe* 02/23/2022 Positive*    Total Counted 02/23/2022 100     Neutrophil % Synovial 02/23/2022 67     Lymph % Synovial 02/23/2022 8     Monocyte % Synovial 02/23/2022 24     Histiocyte % Synovial 02/23/2022 1           Physical Exam  Vitals and nursing note reviewed  Constitutional:       General: He is not in acute distress  Appearance: He is well-developed  HENT:      Head: Normocephalic and atraumatic  Cardiovascular:      Rate and Rhythm: Normal rate and regular rhythm  Heart sounds: Normal heart sounds  No murmur heard  Pulmonary:      Effort: Pulmonary effort is normal       Breath sounds: Normal breath sounds  No wheezing or rales  Abdominal:      General: Bowel sounds are normal  There is no distension  Palpations: Abdomen is soft  Tenderness: There is no abdominal tenderness  There is no guarding or rebound  Musculoskeletal:         General: No tenderness  Normal range of motion  Cervical back: Normal range of motion and neck supple  Lymphadenopathy:      Cervical: No cervical adenopathy  Skin:     General: Skin is warm and dry  Capillary Refill: Capillary refill takes less than 2 seconds  Findings: No rash  Neurological:      Mental Status: He is alert and oriented to person, place, and time  Cranial Nerves: No cranial nerve deficit  Sensory: No sensory deficit  Motor: No abnormal muscle tone  Psychiatric:         Behavior: Behavior normal          Thought Content:  Thought content normal          Judgment: Judgment normal              Shayy Neves MD  [place] [id number]

## 2022-06-08 ENCOUNTER — RA CDI HCC (OUTPATIENT)
Dept: OTHER | Facility: HOSPITAL | Age: 87
End: 2022-06-08

## 2022-06-08 NOTE — PROGRESS NOTES
[address] [facility]    sarcoidosis  No documentation supports further classification of the dx

## 2022-09-19 ENCOUNTER — OFFICE VISIT (OUTPATIENT)
Dept: FAMILY MEDICINE CLINIC | Facility: CLINIC | Age: 87
End: 2022-09-19
Payer: MEDICARE

## 2022-09-19 VITALS
HEART RATE: 78 BPM | WEIGHT: 155 LBS | DIASTOLIC BLOOD PRESSURE: 62 MMHG | SYSTOLIC BLOOD PRESSURE: 122 MMHG | BODY MASS INDEX: 22.19 KG/M2 | OXYGEN SATURATION: 98 % | RESPIRATION RATE: 16 BRPM | HEIGHT: 70 IN

## 2022-09-19 DIAGNOSIS — I34.1 MVP (MITRAL VALVE PROLAPSE): ICD-10-CM

## 2022-09-19 DIAGNOSIS — E78.2 MIXED HYPERLIPIDEMIA: ICD-10-CM

## 2022-09-19 DIAGNOSIS — Z71.89 OTHER SPECIFIED COUNSELING: ICD-10-CM

## 2022-09-19 DIAGNOSIS — Z79.899 OTHER LONG TERM (CURRENT) DRUG THERAPY: ICD-10-CM

## 2022-09-19 DIAGNOSIS — E61.1 IRON DEFICIENCY: ICD-10-CM

## 2022-09-19 DIAGNOSIS — I10 PRIMARY HYPERTENSION: ICD-10-CM

## 2022-09-19 DIAGNOSIS — I25.10 CORONARY ARTERY DISEASE INVOLVING NATIVE CORONARY ARTERY OF NATIVE HEART WITHOUT ANGINA PECTORIS: Primary | ICD-10-CM

## 2022-09-19 PROCEDURE — 99214 OFFICE O/P EST MOD 30 MIN: CPT | Performed by: FAMILY MEDICINE

## 2022-09-19 RX ORDER — SODIUM FLUORIDE 6 MG/ML
PASTE, DENTIFRICE DENTAL
COMMUNITY
Start: 2022-09-08

## 2022-09-19 NOTE — PROGRESS NOTES
Assessment/Plan:    Not driving at night   And needs glasses for distance with the TV   Cont  dont donate any more blood       1  Coronary artery disease involving native coronary artery of native heart without angina pectoris    2  Mixed hyperlipidemia  -     Lipid Panel with Direct LDL reflex; Future; Expected date: 11/21/2022  -     Comprehensive metabolic panel; Future; Expected date: 11/21/2022    3  Primary hypertension    4  MVP (mitral valve prolapse)    5  Other specified counseling    6  Other long term (current) drug therapy  -     HEMOGLOBIN A1C W/ EAG ESTIMATION; Future; Expected date: 11/21/2022    7  Iron deficiency  -     Iron Panel (Includes Ferritin, Iron Sat%, Iron, and TIBC); Future; Expected date: 11/21/2022    8  BMI 22 0-22 9, adult       Subjective:      Patient ID: Alessio Lenz is a 81 y o  male  HPI  Here to go over chronic issues and labs / imaging studies if applicable  Cont with cardio Saadia - CAD MVP HTN HLD  Consider CTS brace   PreDM and hx of sarcoidosis   Cant give blood due to the low iron   Left knee is much better since Lyme arthritis       The following portions of the patient's history were reviewed and updated as appropriate: allergies, current medications, past family history, past medical history, past social history, past surgical history and problem list     Review of Systems   Constitutional: Negative for activity change, appetite change and fever  HENT: Negative for congestion, nosebleeds and trouble swallowing  Eyes: Negative for itching  Respiratory: Negative for cough and chest tightness  Cardiovascular: Negative for chest pain and palpitations  Gastrointestinal: Negative for abdominal pain, constipation, diarrhea and nausea  Endocrine: Negative for cold intolerance  Genitourinary: Negative for frequency  Musculoskeletal: Negative for gait problem and joint swelling  Skin: Negative for rash     Allergic/Immunologic: Negative for immunocompromised state  Neurological: Negative for dizziness, tremors, seizures, syncope and headaches  Psychiatric/Behavioral: Negative for hallucinations and suicidal ideas  Objective:      /62 (BP Location: Left arm, Patient Position: Sitting, Cuff Size: Standard)   Pulse 78   Resp 16   Ht 5' 10" (1 778 m)   Wt 70 3 kg (155 lb)   SpO2 98%   BMI 22 24 kg/m²     No visits with results within 2 Week(s) from this visit  Latest known visit with results is:   Hospital Outpatient Visit on 02/23/2022   Component Date Value    Body Fluid Culture, Ster* 02/23/2022 Growth in Broth culture only Diphtheroids     Gram Stain Result 02/23/2022 1+ Polys     Gram Stain Result 02/23/2022 No organisms seen     Site 02/23/2022 left knee     Color, Fluid 02/23/2022 Dk Yellow     Clarity, Fluid 02/23/2022 Cloudy (A)    WBC, Fluid 02/23/2022 16,797 (A)    Crystals, Synovial Fluid 02/23/2022 No Crystals Seen     Lyme Disease(B burgdorfe* 02/23/2022 Positive (A)    Total Counted 02/23/2022 100     Neutrophil % Synovial 02/23/2022 67     Lymph % Synovial 02/23/2022 8     Monocyte % Synovial 02/23/2022 24     Histiocyte % Synovial 02/23/2022 1           Physical Exam  Vitals and nursing note reviewed  Constitutional:       General: He is not in acute distress  Appearance: He is well-developed  HENT:      Head: Normocephalic and atraumatic  Cardiovascular:      Rate and Rhythm: Normal rate and regular rhythm  Heart sounds: Normal heart sounds  No murmur heard  Pulmonary:      Effort: Pulmonary effort is normal       Breath sounds: Normal breath sounds  No wheezing or rales  Abdominal:      General: Bowel sounds are normal  There is no distension  Palpations: Abdomen is soft  Tenderness: There is no abdominal tenderness  There is no guarding or rebound  Musculoskeletal:         General: No tenderness  Normal range of motion        Cervical back: Normal range of motion and neck supple  Lymphadenopathy:      Cervical: No cervical adenopathy  Skin:     General: Skin is warm and dry  Capillary Refill: Capillary refill takes less than 2 seconds  Findings: No rash  Neurological:      Mental Status: He is alert and oriented to person, place, and time  Cranial Nerves: No cranial nerve deficit  Sensory: No sensory deficit  Motor: No abnormal muscle tone  Psychiatric:         Behavior: Behavior normal          Thought Content: Thought content normal          Judgment: Judgment normal          BMI Counseling: Body mass index is 22 24 kg/m²  The BMI is above normal  Nutrition recommendations include decreasing portion sizes, encouraging healthy choices of fruits and vegetables, decreasing fast food intake, consuming healthier snacks and limiting drinks that contain sugar  Exercise recommendations include exercising 3-5 times per week  No pharmacotherapy was ordered  Rationale for BMI follow-up plan is due to patient being overweight or obese  Depression Screening and Follow-up Plan: Patient was screened for depression during today's encounter  They screened negative with a PHQ-2 score of 0  Falls Plan of Care: balance, strength, and gait training instructions were provided  Medications that increase falls were reviewed         Jayde Joaquin MD  [place] [id number]

## 2022-11-21 LAB — HBA1C MFR BLD HPLC: 6.1 %

## 2022-11-29 ENCOUNTER — TELEPHONE (OUTPATIENT)
Dept: FAMILY MEDICINE CLINIC | Facility: CLINIC | Age: 87
End: 2022-11-29

## 2022-11-29 NOTE — TELEPHONE ENCOUNTER
----- Message from Radha Castro MD sent at 11/29/2022  5:34 AM EST -----  No changes to sugar still 6 1   watch the white rice, white bread, white Potato, and pasta  Maybe try the brown /whole wheat versions, or just limit how much and how often  Also be careful of the juices and sodas, and of course the sweets       The iron is good this time   And the chol is good to o  Kidneys are fine for your age

## 2023-02-22 DIAGNOSIS — D86.9 SARCOIDOSIS: Primary | ICD-10-CM

## 2023-02-22 DIAGNOSIS — R91.1 LUNG NODULE: ICD-10-CM

## 2023-03-06 ENCOUNTER — HOSPITAL ENCOUNTER (OUTPATIENT)
Dept: CT IMAGING | Facility: HOSPITAL | Age: 88
Discharge: HOME | End: 2023-03-06

## 2023-03-06 DIAGNOSIS — D86.9 SARCOIDOSIS: ICD-10-CM

## 2023-03-06 DIAGNOSIS — R91.1 LUNG NODULE: ICD-10-CM

## 2023-03-09 DIAGNOSIS — D86.9 SARCOIDOSIS: Primary | ICD-10-CM

## 2023-03-10 ENCOUNTER — TELEPHONE (OUTPATIENT)
Dept: FAMILY MEDICINE CLINIC | Facility: CLINIC | Age: 88
End: 2023-03-10

## 2023-03-10 NOTE — TELEPHONE ENCOUNTER
----- Message from Jyoti Lucas MD sent at 3/9/2023  8:11 PM EST -----  The sarcoidosis has worsened we have some interstitial lung disease that is more severe throughout the left lung  I did put in for him to follow-up with a pulmonologist

## 2023-03-20 ENCOUNTER — OFFICE VISIT (OUTPATIENT)
Dept: FAMILY MEDICINE CLINIC | Facility: CLINIC | Age: 88
End: 2023-03-20

## 2023-03-20 VITALS
SYSTOLIC BLOOD PRESSURE: 124 MMHG | DIASTOLIC BLOOD PRESSURE: 82 MMHG | HEART RATE: 66 BPM | HEIGHT: 70 IN | OXYGEN SATURATION: 95 % | WEIGHT: 145 LBS | BODY MASS INDEX: 20.76 KG/M2 | RESPIRATION RATE: 16 BRPM

## 2023-03-20 DIAGNOSIS — D86.9 SARCOIDOSIS: ICD-10-CM

## 2023-03-20 DIAGNOSIS — E78.2 MIXED HYPERLIPIDEMIA: ICD-10-CM

## 2023-03-20 DIAGNOSIS — I34.1 MVP (MITRAL VALVE PROLAPSE): ICD-10-CM

## 2023-03-20 DIAGNOSIS — K59.1 FUNCTIONAL DIARRHEA: Primary | ICD-10-CM

## 2023-03-20 DIAGNOSIS — I27.20 PULMONARY HYPERTENSION (HCC): ICD-10-CM

## 2023-03-20 DIAGNOSIS — R73.03 PREDIABETES: ICD-10-CM

## 2023-03-20 DIAGNOSIS — I25.10 CORONARY ARTERY DISEASE INVOLVING NATIVE CORONARY ARTERY OF NATIVE HEART WITHOUT ANGINA PECTORIS: ICD-10-CM

## 2023-03-20 DIAGNOSIS — E61.1 IRON DEFICIENCY: ICD-10-CM

## 2023-03-20 DIAGNOSIS — I10 PRIMARY HYPERTENSION: ICD-10-CM

## 2023-03-20 NOTE — PATIENT INSTRUCTIONS
Medicare Preventive Visit Patient Instructions  Thank you for completing your Welcome to Medicare Visit or Medicare Annual Wellness Visit today  Your next wellness visit will be due in one year (3/20/2024)  The screening/preventive services that you may require over the next 5-10 years are detailed below  Some tests may not apply to you based off risk factors and/or age  Screening tests ordered at today's visit but not completed yet may show as past due  Also, please note that scanned in results may not display below  Preventive Screenings:  Service Recommendations Previous Testing/Comments   Colorectal Cancer Screening  · Colonoscopy    · Fecal Occult Blood Test (FOBT)/Fecal Immunochemical Test (FIT)  · Fecal DNA/Cologuard Test  · Flexible Sigmoidoscopy Age: 39-70 years old   Colonoscopy: every 10 years (May be performed more frequently if at higher risk)  OR  FOBT/FIT: every 1 year  OR  Cologuard: every 3 years  OR  Sigmoidoscopy: every 5 years  Screening may be recommended earlier than age 39 if at higher risk for colorectal cancer  Also, an individualized decision between you and your healthcare provider will decide whether screening between the ages of 74-80 would be appropriate   Colonoscopy: Not on file  FOBT/FIT: Not on file  Cologuard: Not on file  Sigmoidoscopy: Not on file    Screening Not Indicated     Prostate Cancer Screening Individualized decision between patient and health care provider in men between ages of 53-78   Medicare will cover every 12 months beginning on the day after your 50th birthday PSA: No results in last 5 years     Screening Not Indicated     Hepatitis C Screening Once for adults born between [birth year] and 1965  More frequently in patients at high risk for Hepatitis C Hep C Antibody: Not on file        Diabetes Screening 1-2 times per year if you're at risk for diabetes or have pre-diabetes Fasting glucose: 96 mg/dL (8/11/2020)  A1C: 6 1 % (11/21/2022)  Screening Current Cholesterol Screening Once every 5 years if you don't have a lipid disorder  May order more often based on risk factors  Lipid panel: 02/06/2023  Screening Not Indicated  History Lipid Disorder      Other Preventive Screenings Covered by Medicare:  1  Abdominal Aortic Aneurysm (AAA) Screening: covered once if your at risk  You're considered to be at risk if you have a family history of AAA or a male between the age of 73-68 who smoking at least 100 cigarettes in your lifetime  2  Lung Cancer Screening: covers low dose CT scan once per year if you meet all of the following conditions: (1) Age 50-69; (2) No signs or symptoms of lung cancer; (3) Current smoker or have quit smoking within the last 15 years; (4) You have a tobacco smoking history of at least 20 pack years (packs per day x number of years you smoked); (5) You get a written order from a healthcare provider  3  Glaucoma Screening: covered annually if you're considered high risk: (1) You have diabetes OR (2) Family history of glaucoma OR (3)  aged 48 and older OR (3)  American aged 72 and older  3  Osteoporosis Screening: covered every 2 years if you meet one of the following conditions: (1) Have a vertebral abnormality; (2) On glucocorticoid therapy for more than 3 months; (3) Have primary hyperparathyroidism; (4) On osteoporosis medications and need to assess response to drug therapy  5  HIV Screening: covered annually if you're between the age of 12-76  Also covered annually if you are younger than 13 and older than 72 with risk factors for HIV infection  For pregnant patients, it is covered up to 3 times per pregnancy      Immunizations:  Immunization Recommendations   Influenza Vaccine Annual influenza vaccination during flu season is recommended for all persons aged >= 6 months who do not have contraindications   Pneumococcal Vaccine   * Pneumococcal conjugate vaccine = PCV13 (Prevnar 13), PCV15 (Vaxneuvance), PCV20 (Prevnar 20)  * Pneumococcal polysaccharide vaccine = PPSV23 (Pneumovax) Adults 2364 years old: 1-3 doses may be recommended based on certain risk factors  Adults 72 years old: 1-2 doses may be recommended based off what pneumonia vaccine you previously received   Hepatitis B Vaccine 3 dose series if at intermediate or high risk (ex: diabetes, end stage renal disease, liver disease)   Tetanus (Td) Vaccine - COST NOT COVERED BY MEDICARE PART B Following completion of primary series, a booster dose should be given every 10 years to maintain immunity against tetanus  Td may also be given as tetanus wound prophylaxis  Tdap Vaccine - COST NOT COVERED BY MEDICARE PART B Recommended at least once for all adults  For pregnant patients, recommended with each pregnancy  Shingles Vaccine (Shingrix) - COST NOT COVERED BY MEDICARE PART B  2 shot series recommended in those aged 48 and above     Health Maintenance Due:  There are no preventive care reminders to display for this patient  Immunizations Due:      Topic Date Due   • COVID-19 Vaccine (3 - Booster for Moderna series) 05/25/2021     Advance Directives   What are advance directives? Advance directives are legal documents that state your wishes and plans for medical care  These plans are made ahead of time in case you lose your ability to make decisions for yourself  Advance directives can apply to any medical decision, such as the treatments you want, and if you want to donate organs  What are the types of advance directives? There are many types of advance directives, and each state has rules about how to use them  You may choose a combination of any of the following:  · Living will: This is a written record of the treatment you want  You can also choose which treatments you do not want, which to limit, and which to stop at a certain time  This includes surgery, medicine, IV fluid, and tube feedings  · Durable power of  for healthcare [facility]):   This is a written record that states who you want to make healthcare choices for you when you are unable to make them for yourself  This person, called a proxy, is usually a family member or a friend  You may choose more than 1 proxy  · Do not resuscitate (DNR) order:  A DNR order is used in case your heart stops beating or you stop breathing  It is a request not to have certain forms of treatment, such as CPR  A DNR order may be included in other types of advance directives  · Medical directive: This covers the care that you want if you are in a coma, near death, or unable to make decisions for yourself  You can list the treatments you want for each condition  Treatment may include pain medicine, surgery, blood transfusions, dialysis, IV or tube feedings, and a ventilator (breathing machine)  · Values history: This document has questions about your views, beliefs, and how you feel and think about life  This information can help others choose the care that you would choose  Why are advance directives important? An advance directive helps you control your care  Although spoken wishes may be used, it is better to have your wishes written down  Spoken wishes can be misunderstood, or not followed  Treatments may be given even if you do not want them  An advance directive may make it easier for your family to make difficult choices about your care  © Copyright [employer] 2018 Information is for End User's use only and may not be sold, redistributed or otherwise used for commercial purposes   All illustrations and images included in CareNotes® are the copyrighted property of [employer]  or [address] [place] [address] [employer]

## 2023-03-20 NOTE — PROGRESS NOTES
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    Pulmonary hypertension (HCC)    Hypertension    MVP (mitral valve prolapse)     Follows with cardiology             Other    Prediabetes     6 1   Cont same          Iron deficiency     Stop the iron pills   Not giving blood anymore         Sarcoidosis    Hyperlipidemia   Other Visit Diagnoses     Functional diarrhea    -  Primary    probiotic 10       Gave him a list of medications that he should take on his typical improves     Preventive health issues were discussed with patient, and age appropriate screening tests were ordered as noted in patient's After Visit Summary  Personalized health advice and appropriate referrals for health education or preventive services given if needed, as noted in patient's After Visit Summary  History of Present Illness:     Patient presents for a Medicare Wellness Visit    Here to go over chronic issues and labs / imaging studies if applicable  Discussed left inguinal hernia  BL CTS   Going on a cruise end of April        Patient Care Team:  Kelsey Guillen MD as PCP - General (Family Medicine)     Review of Systems:     Review of Systems   Constitutional: Negative for activity change, appetite change and fever  HENT: Negative for congestion, nosebleeds and trouble swallowing  Eyes: Negative for itching  Respiratory: Negative for cough and chest tightness  Cardiovascular: Negative for chest pain and palpitations  Gastrointestinal: Negative for abdominal pain, constipation, diarrhea and nausea  Endocrine: Negative for cold intolerance  Genitourinary: Negative for frequency  Musculoskeletal: Negative for gait problem and joint swelling  Skin: Negative for rash  Allergic/Immunologic: Negative for immunocompromised state     Neurological: Negative for dizziness, tremors, seizures, syncope and headaches  Psychiatric/Behavioral: Negative for hallucinations and suicidal ideas  Problem List:     Patient Active Problem List   Diagnosis   • Prediabetes   • 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 pectoris   • Iron deficiency   • Pulmonary hypertension (HCC)   • Hypertension   • Sarcoidosis   • Pulmonary nodules   • MVP (mitral valve prolapse)   • Hyperlipidemia      Past Medical and Surgical History:     Past Medical History:   Diagnosis Date   • Cancer ([facility] [address] )     Lesion left calf removed   • Hypertension    • Pulmonary hypertension ([facility] [address] )      Past Surgical History:   Procedure Laterality Date   • APPENDECTOMY     • CATARACT EXTRACTION, BILATERAL     • COLONOSCOPY  2018      Family History:     Family History   Problem Relation Age of Onset   • Heart disease Father    • Sudden death Father 67   • COPD Neg Hx    • Asthma Neg Hx    • Lung cancer Neg Hx       Social History:     Social History     Socioeconomic History   • Marital status:       Spouse name: None   • Number of children: 2   • Years of education: 15   • Highest education level: None   Occupational History   • Occupation: RETIRED   Tobacco Use   • Smoking status: Never   • Smokeless tobacco: Never   Vaping Use   • Vaping Use: Never used   Substance and Sexual Activity   • Alcohol use: None     Comment: NOne   • Drug use: None     Comment: NONE   • Sexual activity: None   Other Topics Concern   • None   Social History Narrative    Most recent tobacco use screenin2020    Do you currently or have you served in the [employer]: No    Were you activated, into active duty, as a member of the [employer] or as a Reservist: No    Advance directive: No    Alcohol intake: None    Advance directive - Provider has reviewed directives and consents to follow them (insert provider name with any objections in notes field): No    Caffeine intake: None    Illicit drugs: none    Occupation: retired Exercise level: Moderate    Single or multi-level home/work: single level home    Live alone or with others: alone    Chewing tobacco: none    Marital status:     Number of children: 2    Diet: Regular    Sexual orientation: Heterosexual    Smoke alarm in home: Yes    General stress level: Low    Sunscreen used routinely: No    Education: 12    Guns present in home: No    Presence of domestic violence: No    Passive smoke exposure: Yes    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 ; printing    Asbestos exposure: No    TB exposure: No    Environmental exposure: No    Animal exposure: No    Legally blind in one or both eyes: No     Social Determinants of Health     Financial Resource Strain: Low Risk    • Difficulty of Paying Living Expenses: Not hard at all   Food Insecurity: Not on file   Transportation Needs: No Transportation Needs   • Lack of Transportation (Medical): No   • Lack of Transportation (Non-Medical):  No   Physical Activity: Not on file   Stress: Not on file   Social Connections: Not on file   Intimate Partner Violence: Not on file   Housing Stability: Not on file      Medications and Allergies:     Current Outpatient Medications   Medication Sig Dispense Refill   • Ascorbic Acid, Vitamin C, (VITAMIN C) 100 MG tablet Take 500 mg by mouth daily       • aspirin 325 mg tablet Take 325 mg by mouth every other day Take 1/2 of pill every other day      • atorvastatin (LIPITOR) 40 mg tablet Take 1 tablet (40 mg total) by mouth daily 90 tablet 1   • Coenzyme Q10 10 MG capsule Take 10 mg by mouth daily     • Docosahexaenoic Acid--270 MG CAPS Take by mouth daily     • Glucosamine-Chondroit-Vit C-Mn (Glucosamine Chondr 1500 Complx) CAPS Take by mouth daily     • lisinopril (ZESTRIL) 2 5 mg tablet TAKE 1 TABLET BY MOUTH EVERY DAY     • Lutein 10 MG TABS Take by mouth daily     • multivitamin (THERAGRAN) TABS Take 1 tablet by mouth daily     • PreviDent 5000 Booster Plus 1 1 % PSTE      • SF 5000 Plus 1 1 % CREA BRUSH FOR 2 MINUTES ONCE DAILY AT BEDTIME  EXPECTORATE  NOTHING BY MOUTH 30 MINUTES     • vitamin E, tocopherol, 200 units capsule Take 200 Units by mouth daily       No current facility-administered medications for this visit  No Known Allergies   Immunizations:     Immunization History   Administered Date(s) Administered   • COVID-19 MODERNA VACC 0 5 ML IM 03/02/2021, 03/30/2021   • DTaP 08/11/2014   • INFLUENZA 09/28/2018, 09/22/2022   • Influenza, high dose seasonal 0 7 mL 11/06/2020, 10/24/2021   • Pneumococcal Conjugate 13-Valent 03/23/2018   • Pneumococcal Polysaccharide PPV23 08/11/2014   • Tdap 08/11/2014      Health Maintenance: There are no preventive care reminders to display for this patient  Topic Date Due   • COVID-19 Vaccine (3 - Booster for Moderna series) 05/25/2021      Medicare Screening Tests and Risk Assessments:     Pino Lange is here for his Subsequent Wellness visit  Last Medicare Wellness visit information reviewed, patient interviewed and updates made to the record today  Health Risk Assessment:   Patient rates overall health as very good  Patient feels that their physical health rating is same  Patient is very satisfied with their life  Eyesight was rated as same  Hearing was rated as same  Patient feels that their emotional and mental health rating is same  Patients states they are never, rarely angry  Patient states they are never, rarely unusually tired/fatigued  Pain experienced in the last 7 days has been none  Patient states that he has experienced no weight loss or gain in last 6 months  Depression Screening:   PHQ-2 Score: 0      Fall Risk Screening: In the past year, patient has experienced: no history of falling in past year      Home Safety:  Patient does not have trouble with stairs inside or outside of their home  Patient has working smoke alarms and has working carbon monoxide detector  Home safety hazards include: none       Nutrition: Current diet is Regular  Medications:   Patient is currently taking over-the-counter supplements  OTC medications include: see medication list  Patient is able to manage medications  Activities of Daily Living (ADLs)/Instrumental Activities of Daily Living (IADLs):   Walk and transfer into and out of bed and chair?: Yes  Dress and groom yourself?: Yes    Bathe or shower yourself?: Yes    Feed yourself? Yes  Do your laundry/housekeeping?: Yes  Manage your money, pay your bills and track your expenses?: Yes  Make your own meals?: Yes    Do your own shopping?: Yes    Previous Hospitalizations:   Any hospitalizations or ED visits within the last 12 months?: No      Advance Care Planning:   Living will: No    Durable POA for healthcare: No    Advanced directive: No    Five wishes given: No      Cognitive Screening:   Provider or family/friend/caregiver concerned regarding cognition?: No    PREVENTIVE SCREENINGS      Cardiovascular Screening:    General: Screening Not Indicated, History Lipid Disorder and Screening Current      Diabetes Screening:     General: Screening Current    Due for: Blood Glucose      Colorectal Cancer Screening:     General: Screening Not Indicated      Prostate Cancer Screening:    General: Screening Not Indicated      Lung Cancer Screening:     General: Screening Not Indicated    Screening, Brief Intervention, and Referral to Treatment (SBIRT)    Screening  Typical number of drinks in a day: 0  Typical number of drinks in a week: 0  Interpretation: Low risk drinking behavior  Single Item Drug Screening:  How often have you used an illegal drug (including marijuana) or a prescription medication for non-medical reasons in the past year? never    Single Item Drug Screen Score: 0  Interpretation: Negative screen for possible drug use disorder    No results found       Physical Exam:     /82 (BP Location: Left arm, Patient Position: Sitting, Cuff Size: Standard)   Pulse 66   Resp 16    5' 10" (1 778 m)   Wt 65 8 kg (145 lb)   SpO2 95%   BMI 20 81 kg/m²     Physical Exam  Vitals and nursing note reviewed  Constitutional:       Appearance: He is well-developed  HENT:      Head: Normocephalic and atraumatic  Eyes:      Conjunctiva/sclera: Conjunctivae normal       Pupils: Pupils are equal, round, and reactive to light  Cardiovascular:      Rate and Rhythm: Normal rate and regular rhythm  Heart sounds: Normal heart sounds  Pulmonary:      Effort: Pulmonary effort is normal       Breath sounds: Normal breath sounds  No wheezing or rales  Abdominal:      General: Bowel sounds are normal  There is no distension  Palpations: Abdomen is soft  Tenderness: There is no abdominal tenderness  Musculoskeletal:         General: No tenderness  Normal range of motion  Cervical back: Normal range of motion and neck supple  Skin:     General: Skin is warm and dry  Findings: No rash  Neurological:      Mental Status: He is alert and oriented to person, place, and time  Cranial Nerves: No cranial nerve deficit  Sensory: No sensory deficit  Coordination: Coordination normal    Psychiatric:         Behavior: Behavior normal          Thought Content:  Thought content normal          Judgment: Judgment normal           Mira Arriola MD

## 2023-05-23 ENCOUNTER — CONSULT (OUTPATIENT)
Dept: PULMONOLOGY | Facility: CLINIC | Age: 88
End: 2023-05-23

## 2023-05-23 VITALS
SYSTOLIC BLOOD PRESSURE: 126 MMHG | HEART RATE: 67 BPM | OXYGEN SATURATION: 96 % | WEIGHT: 154 LBS | BODY MASS INDEX: 22.05 KG/M2 | TEMPERATURE: 98.1 F | DIASTOLIC BLOOD PRESSURE: 82 MMHG | HEIGHT: 70 IN

## 2023-05-23 DIAGNOSIS — D86.9 SARCOIDOSIS: ICD-10-CM

## 2023-05-23 NOTE — PROGRESS NOTES
Assessment/Plan:    Sarcoidosis  Patient had a previous diagnosis of sarcoidosis based on presumably an open lung biopsy many years ago  See extensive overview treated by Dr Pk Rodríguez above  Patient is surprisingly asymptomatic  Has a distinctly abnormal CT scan with advanced fibrosis in the left upper lobe and left lower lobe with somewhat less fibrosis on the right side  Have to wonder whether this patient had sarcoidosis and now has morphed over into IPF  This would be impossible to prove of course  repeat PFTs to compare with previous study from January 2020 which was surprisingly near normal   We will decide what to do based on the new PFTs  He may benefit from antifibrotic therapy  Diagnoses and all orders for this visit:    Sarcoidosis  -     Ambulatory Referral to Pulmonology  -     Complete PFT with post bronchodilator; Future          Subjective:      Patient ID: Burgess Zaragoza is a 81 y o  male  This patient is here for 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  Last seen in pulmonary by Dr Lala Brayd in March 2020  This patient states that he had left lung surgery for some manifestation of sarcoidosis 40 or 50 years ago  We will [facility] to locate those details [facility]   Since that time as far as he knows he never had treatment for sarcoidosis  Has not had any respiratory symptoms surprisingly  Today not having trouble with cough wheezing or shortness of breath  Had an abnormal CT scan in 2019 and now it is abnormal in the same regions but considerably worse  Is a lot of honeycombing  Raises the question as to whether there is 1 disease process here or 2  Previous PFTs were surprisingly minimally abnormal only affecting the DLCO   (January 2020)  No exposure to beryllium  Worked as a printer at [employer]  No exposure to antigens that would cause hypersensitivity pneumonitis apparent  Review of systems does not show any systemic signs of sarcoidosis    No weight "changes of significance  Laboratory studies no abnormalities of LFTs or calcium for mild elevation of alkaline phosphatase  I neglected to ask about exposure to tuberculosis  The following portions of the patient's history were reviewed and updated as appropriate: allergies, current medications, past family history, past medical history, past social history, past surgical history and problem list     Review of Systems   Constitutional: Negative for activity change and unexpected weight change  HENT: Negative for congestion, sinus pressure and sneezing  Eyes: Negative for pain and visual disturbance  Respiratory: Negative for cough, shortness of breath and wheezing  Cardiovascular: Negative for chest pain and palpitations  Gastrointestinal: Negative for abdominal pain  Endocrine: Negative for cold intolerance and heat intolerance  Genitourinary: Positive for enuresis  Musculoskeletal: Negative for arthralgias  Allergic/Immunologic: Negative for environmental allergies  Neurological: Negative  Hematological: Negative for adenopathy  Objective:      /82   Pulse 67   Temp 98 1 °F (36 7 °C)   Ht 5' 10\" (1 778 m)   Wt 69 9 kg (154 lb)   SpO2 96%   BMI 22 10 kg/m²          Physical Exam  Vitals reviewed  Constitutional:       General: He is not in acute distress  Appearance: He is normal weight  He is not ill-appearing  Eyes:      General: No scleral icterus  Conjunctiva/sclera: Conjunctivae normal    Cardiovascular:      Rate and Rhythm: Normal rate and regular rhythm  Pulses:           Radial pulses are 2+ on the right side and 2+ on the left side  Heart sounds: Normal heart sounds  No murmur heard  Pulmonary:      Effort: Pulmonary effort is normal       Breath sounds: Examination of the left-upper field reveals rales  Examination of the left-lower field reveals rales  Rales present  Abdominal:      General: Abdomen is flat   There is no " distension  Palpations: Abdomen is soft  There is no hepatomegaly or splenomegaly  Musculoskeletal:         General: No swelling  Cervical back: No rigidity or tenderness  Right lower le+ Pitting Edema present  Left lower le+ Pitting Edema present  Lymphadenopathy:      Cervical: No cervical adenopathy  Skin:     General: Skin is warm and dry  Neurological:      Mental Status: He is alert and oriented to person, place, and time     Psychiatric:         Mood and Affect: Mood normal          Behavior: Behavior normal

## 2023-05-23 NOTE — ASSESSMENT & PLAN NOTE
Patient had a previous diagnosis of sarcoidosis based on presumably an open lung biopsy many years ago  See extensive overview treated by Dr Juan Jose Vega above  Patient is surprisingly asymptomatic  Has a distinctly abnormal CT scan with advanced fibrosis in the left upper lobe and left lower lobe with somewhat less fibrosis on the right side  Have to wonder whether this patient had sarcoidosis and now has morphed over into IPF  This would be impossible to prove of course  repeat PFTs to compare with previous study from January 2020 which was surprisingly near normal   We will decide what to do based on the new PFTs  He may benefit from antifibrotic therapy

## 2023-06-27 ENCOUNTER — HOSPITAL ENCOUNTER (OUTPATIENT)
Dept: PULMONOLOGY | Facility: HOSPITAL | Age: 88
Discharge: HOME | End: 2023-06-27
Attending: INTERNAL MEDICINE
Payer: MEDICARE

## 2023-06-27 DIAGNOSIS — D86.9 SARCOIDOSIS: ICD-10-CM

## 2023-06-27 PROCEDURE — 94760 N-INVAS EAR/PLS OXIMETRY 1: CPT

## 2023-06-27 PROCEDURE — 94729 DIFFUSING CAPACITY: CPT

## 2023-06-27 PROCEDURE — 94726 PLETHYSMOGRAPHY LUNG VOLUMES: CPT | Performed by: INTERNAL MEDICINE

## 2023-06-27 PROCEDURE — 94729 DIFFUSING CAPACITY: CPT | Performed by: INTERNAL MEDICINE

## 2023-06-27 PROCEDURE — 94726 PLETHYSMOGRAPHY LUNG VOLUMES: CPT

## 2023-06-27 PROCEDURE — 94060 EVALUATION OF WHEEZING: CPT

## 2023-06-27 PROCEDURE — 94060 EVALUATION OF WHEEZING: CPT | Performed by: INTERNAL MEDICINE

## 2023-06-27 RX ORDER — ALBUTEROL SULFATE 2.5 MG/3ML
2.5 SOLUTION RESPIRATORY (INHALATION) ONCE
Status: COMPLETED | OUTPATIENT
Start: 2023-06-27 | End: 2023-06-27

## 2023-06-27 RX ADMIN — ALBUTEROL SULFATE 2.5 MG: 2.5 SOLUTION RESPIRATORY (INHALATION) at 12:37

## 2023-08-29 ENCOUNTER — OFFICE VISIT (OUTPATIENT)
Dept: PULMONOLOGY | Facility: CLINIC | Age: 88
End: 2023-08-29
Payer: MEDICARE

## 2023-08-29 VITALS
HEIGHT: 70 IN | BODY MASS INDEX: 21.19 KG/M2 | DIASTOLIC BLOOD PRESSURE: 70 MMHG | HEART RATE: 58 BPM | OXYGEN SATURATION: 98 % | WEIGHT: 148 LBS | SYSTOLIC BLOOD PRESSURE: 116 MMHG

## 2023-08-29 DIAGNOSIS — J84.10 PULMONARY FIBROSIS (HCC): Primary | ICD-10-CM

## 2023-08-29 DIAGNOSIS — I27.20 PULMONARY HYPERTENSION (HCC): ICD-10-CM

## 2023-08-29 PROCEDURE — 99213 OFFICE O/P EST LOW 20 MIN: CPT | Performed by: INTERNAL MEDICINE

## 2023-08-29 NOTE — PROGRESS NOTES
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 NO sx or signs of CT disease. Remains surprisingly asx. Pft does not seem to indicate any major lung function change since 2020. Continue to monitor fvc in the office. Still to consider antifibrotic therapy if there is decline in function       There are no diagnoses linked to this encounter. Subjective:      Patient ID: Pedro Kelly is a 81 y.o. male. This patient is seen for fibrosis on recent ct scan. 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 [facility]) No signif. SOB. NO cough. No gerd sx or aspiration. Weight down 6 lbs. Since May. No sx of CT disease. No classic sx of pulmonary hypertension. Lung function not impaired enough to suggest Type 3 pulmonary hypertension, but the latter can come from sarcoid. The following portions of the patient's history were reviewed and updated as appropriate: allergies, current medications, past family history, past medical history, past social history, past surgical history and problem list.    Review of Systems   Constitutional: Negative for activity change and unexpected weight change. Eyes: Negative for pain and visual disturbance. Respiratory: Negative for cough, choking, shortness of breath and wheezing. Cardiovascular: Negative for chest pain, palpitations and leg swelling. Gastrointestinal: Negative for abdominal pain. Musculoskeletal: Negative for arthralgias. Skin: Negative. Allergic/Immunologic: Negative for immunocompromised state. Neurological: Negative. Hematological: Negative for adenopathy. Objective:      /70 (BP Location: Right arm, Patient Position: Sitting, Cuff Size: Standard)   Pulse 58   Ht 5' 10" (1.778 m)   Wt 67.1 kg (148 lb)   SpO2 98%   BMI 21.24 kg/m²          Physical Exam  Vitals reviewed.    Constitutional: General: He is not in acute distress. Appearance: He is normal weight. He is not ill-appearing. Neck:      Vascular: No JVD. Cardiovascular:      Rate and Rhythm: Normal rate and regular rhythm. Pulses:           Radial pulses are 2+ on the right side and 2+ on the left side. Heart sounds: No murmur heard. Comments:  Increased S2 left sternal border  Pulmonary:      Breath sounds: Examination of the left-upper field reveals rales. Examination of the right-lower field reveals rales. Examination of the left-lower field reveals rales. Rales present. Musculoskeletal:         General: No swelling. Cervical back: No rigidity or tenderness. Right lower leg: No edema. Left lower le+ Edema present. Lymphadenopathy:      Cervical: No cervical adenopathy. Skin:     General: Skin is warm and dry. Neurological:      Mental Status: He is alert and oriented to person, place, and time.    Psychiatric:         Mood and Affect: Mood normal.         Behavior: Behavior normal.

## 2023-08-29 NOTE — ASSESSMENT & PLAN NOTE
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 NO sx or signs of CT disease. Remains surprisingly asx. Pft does not seem to indicate any major lung function change since 2020. Continue to monitor fvc in the office.  Still to consider antifibrotic therapy if there is decline in function

## 2023-09-20 ENCOUNTER — OFFICE VISIT (OUTPATIENT)
Dept: FAMILY MEDICINE CLINIC | Facility: CLINIC | Age: 88
End: 2023-09-20
Payer: MEDICARE

## 2023-09-20 VITALS
HEART RATE: 62 BPM | HEIGHT: 70 IN | BODY MASS INDEX: 21.62 KG/M2 | RESPIRATION RATE: 16 BRPM | SYSTOLIC BLOOD PRESSURE: 118 MMHG | WEIGHT: 151 LBS | DIASTOLIC BLOOD PRESSURE: 76 MMHG | OXYGEN SATURATION: 100 %

## 2023-09-20 DIAGNOSIS — E55.9 VITAMIN D DEFICIENCY: Primary | ICD-10-CM

## 2023-09-20 DIAGNOSIS — E53.8 B12 DEFICIENCY: ICD-10-CM

## 2023-09-20 DIAGNOSIS — Z79.899 OTHER LONG TERM (CURRENT) DRUG THERAPY: ICD-10-CM

## 2023-09-20 DIAGNOSIS — R73.03 PREDIABETES: ICD-10-CM

## 2023-09-20 PROCEDURE — 99214 OFFICE O/P EST MOD 30 MIN: CPT | Performed by: FAMILY MEDICINE

## 2023-09-20 NOTE — PROGRESS NOTES
Assessment/Plan:  1. Vitamin D deficiency  -     Vitamin D 25 hydroxy; Future; Expected date: 11/13/2023    2. B12 deficiency  -     Vitamin B12; Future; Expected date: 11/13/2023    3. Other long term (current) drug therapy  -     HEMOGLOBIN A1C W/ EAG ESTIMATION; Future; Expected date: 11/13/2023    4. Prediabetes  -     HEMOGLOBIN A1C W/ EAG ESTIMATION; Future; Expected date: 11/13/2023      Health Maintenance  I recommended that he start taking Ensure, which are meal supplements to assist with his weight gain. I advised him to incorporate probiotics into his regimen to address his loose bowel issues. I will order a laboratory request for vitamin D, B12, and blood glucose in a couple of months. I will see him in 6 months. Subjective:      Patient ID: Claire Gonzales is a 81 y.o. male. Naz Finley is an 81-year-old male here for 6-month follow-up. 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 He mentions that he frequently experiences loose bowels. He weighed 148 pounds at the start of this year. He consistently eats breakfast but tends to eat a light lunch. He consumes yogurt twice a week. He is asking for advice on how to gain weight. He has consulted with Dr. Lisha Chowdhury and his echocardiogram results were reviewed. He was recommended for ongoing monitoring of his condition with periodic echocardiograms. He has seen a pulmonologist, Dr. Jayant Posada. His pulmonologist suggested there is a possibility that his pulmonary fibrosis could be related to a history of sarcoidosis  It is more likely to be idiopathic pulmonary fibrosis due to his age. In general, his heart and lungs appear to be in good health. He prefers not to receive the flu vaccine today. He denies shortness of breath and wheezing. He denies lightheadedness and dizziness.     The following portions of the patient's history were reviewed and updated as appropriate: allergies, current medications, past family history, past medical history, past social history, past surgical history and problem list.    Review of Systems   Constitutional: Negative for activity change, appetite change and fever. HENT: Negative for congestion, nosebleeds and trouble swallowing. Eyes: Negative for itching. Respiratory: Negative for cough and chest tightness. Cardiovascular: Negative for chest pain and palpitations. Gastrointestinal: Negative for abdominal pain, constipation, diarrhea and nausea. Endocrine: Negative for cold intolerance. Genitourinary: Negative for frequency. Musculoskeletal: Negative for gait problem and joint swelling. Skin: Negative for rash. Allergic/Immunologic: Negative for immunocompromised state. Neurological: Negative for dizziness, tremors, seizures, syncope and headaches. Psychiatric/Behavioral: Negative for hallucinations and suicidal ideas. Objective:     /76 (BP Location: Right arm, Patient Position: Sitting, Cuff Size: Standard)   Pulse 62   Resp 16   Ht 5' 10" (1.778 m)   Wt 68.5 kg (151 lb)   SpO2 100%   BMI 21.67 kg/m²    Physical Exam  Vitals and nursing note reviewed. Constitutional:     General: Not in acute distress. Appearance: Well-developed. HENT:     Head: Normocephalic and atraumatic. Cardiovascular:     Rate and Rhythm: Normal rate and regular rhythm. Heart sounds: Normal heart sounds. No murmur heard. Pulmonary:     Effort: Pulmonary effort is normal.     Breath sounds: Normal breath sounds. No wheezing or rales. Abdominal:     General: Bowel sounds are normal. There is no distension. Palpations: Abdomen is soft. Tenderness: There is no abdominal tenderness. There is no guarding or rebound. Musculoskeletal:     General: No tenderness. Normal range of motion.      Cervical back: Normal range of motion and neck supple. Lymphadenopathy:     Cervical: No cervical adenopathy. Skin:     General: Skin is warm and dry. Capillary Refill: Capillary refill takes less than 2 seconds. Findings: No rash. Neurological:     Mental Status: Alert and oriented to person, place, and time. Cranial Nerves: No cranial nerve deficit. Sensory: No sensory deficit. Motor: No abnormal muscle tone. Psychiatric:     Behavior: Behavior normal.     Thought Content: Thought content normal.     Judgment: Judgment normal.      No visits with results within 2 Week(s) from this visit. Latest known visit with results is:   Orders Only on 11/21/2022   Component Date Value   • Hemoglobin A1C 11/21/2022 6.1      I have personally reviewed results with the patient. Echocardiogram result  Ejection fraction: 60%. Mild diastolic dysfunction. Borderline prolapse. Mild pulmonary hypertension present. No cardiac symptoms. No evidence of any severe mitral valve prolapse. 2022  Iron and ferritin levels are within a healthy range. 2020   Vitamin D level is 48 nmol/L. LDL cholesterol is 53 mg/dL. Total cholesterol is 112 mg/dL. Triglycerides are at 78 mg/dL. His HbA1c is between 6.0% to 6.1% and he is within the prediabetic range. Claudeen Dun, MD   [facility]     Transcribed for Claudeen Dun, MD, by [facility] on 09/21/23 at 8:53 AM. Powered by [employer].

## 2023-10-07 ENCOUNTER — LAB REQUISITION (OUTPATIENT)
Dept: LAB | Facility: HOSPITAL | Age: 88
End: 2023-10-07
Payer: MEDICARE

## 2023-10-07 DIAGNOSIS — Z13.9 ENCOUNTER FOR SCREENING, UNSPECIFIED: ICD-10-CM

## 2023-10-07 LAB
CHOLEST SERPL-MCNC: 144 MG/DL
HDLC SERPL-MCNC: 55 MG/DL
LDLC SERPL CALC-MCNC: 59 MG/DL (ref 0–100)
NONHDLC SERPL-MCNC: 89 MG/DL
TRIGL SERPL-MCNC: 151 MG/DL

## 2023-10-07 PROCEDURE — 80061 LIPID PANEL: CPT | Performed by: INTERNAL MEDICINE

## 2023-10-10 ENCOUNTER — TELEPHONE (OUTPATIENT)
Dept: FAMILY MEDICINE CLINIC | Facility: CLINIC | Age: 88
End: 2023-10-10

## 2023-11-13 LAB — HBA1C MFR BLD HPLC: 6.1 %

## 2023-11-17 ENCOUNTER — TELEPHONE (OUTPATIENT)
Dept: FAMILY MEDICINE CLINIC | Facility: CLINIC | Age: 88
End: 2023-11-17

## 2023-11-17 NOTE — TELEPHONE ENCOUNTER
----- Message from Lily Laird MD sent at 11/17/2023  2:25 PM EST -----  No changes needed at this time

## 2024-02-27 ENCOUNTER — OFFICE VISIT (OUTPATIENT)
Dept: PULMONOLOGY | Facility: CLINIC | Age: 89
End: 2024-02-27
Payer: MEDICARE

## 2024-02-27 VITALS
DIASTOLIC BLOOD PRESSURE: 54 MMHG | HEIGHT: 70 IN | OXYGEN SATURATION: 97 % | SYSTOLIC BLOOD PRESSURE: 117 MMHG | HEART RATE: 54 BPM | TEMPERATURE: 97.7 F | WEIGHT: 145 LBS | BODY MASS INDEX: 20.76 KG/M2

## 2024-02-27 DIAGNOSIS — J84.10 PULMONARY FIBROSIS (HCC): Primary | ICD-10-CM

## 2024-02-27 DIAGNOSIS — I27.20 PULMONARY HYPERTENSION (HCC): ICD-10-CM

## 2024-02-27 PROCEDURE — 99213 OFFICE O/P EST LOW 20 MIN: CPT | Performed by: INTERNAL MEDICINE

## 2024-02-27 NOTE — PROGRESS NOTES
"Assessment/Plan:    No problem-specific Assessment & Plan notes found for this encounter.       Diagnoses and all orders for this visit:    Pulmonary fibrosis (HCC)          Subjective:      Patient ID: Alexi Del Rosario is a 88 y.o. male.    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  At this point he remains surprisingly asymptomatic without dyspnea cough or sputum.  He states occasional wheeze.  No nocturnal symptoms.  He does state that he sleeps somewhat poorly and irregularly but I do not gather that this is likely to be obstructive sleep apnea.  Has been some literature concerned about nocturnal hypoxemia affecting the course of idiopathic pulmonary fibrosis.  At this point we decided not to measure this directly.  Patient has occasional heartburn and indigestion on therapy for this.  There has been some concern that gastroesophageal reflux disease makes pulm fibrosis worse.  Patient does not recall the details of previous diagnosis of sarcoidosis and  he thinks this was a radiographic diagnosis only.        The following portions of the patient's history were reviewed and updated as appropriate: allergies, current medications, past family history, past medical history, past social history, past surgical history and problem list.    Review of Systems   Constitutional:  Negative for activity change and unexpected weight change.   Respiratory:  Positive for wheezing. Negative for apnea, cough and shortness of breath.    Cardiovascular:  Negative for chest pain, palpitations and leg swelling.   Genitourinary:  Negative for enuresis.   Musculoskeletal:  Negative for arthralgias.   Skin: Negative.    Neurological:  Negative for headaches.         Objective:      /54 (BP Location: Left arm, Patient Position: Sitting, Cuff Size: Standard)   Pulse (!) 54   Temp 97.7 °F (36.5 °C)   Ht 5' 10\" (1.778 m)   Wt 65.8 kg (145 lb)   SpO2 97%   BMI 20.81 " kg/m²          Physical Exam  Vitals reviewed.   Constitutional:       General: He is not in acute distress.     Appearance: He is normal weight. He is not ill-appearing.   Neck:      Vascular: No JVD.   Cardiovascular:      Rate and Rhythm: Normal rate and regular rhythm.      Pulses:           Radial pulses are 2+ on the right side and 2+ on the left side.      Comments: S2 increased at the left sternal border raising the possibility of pulmonary hypertension.  Pulmonary:      Effort: Pulmonary effort is normal.      Breath sounds: Examination of the right-lower field reveals rales. Examination of the left-lower field reveals rales. Rales present. No wheezing or rhonchi.      Comments: Few crackles in the right lung, many more at the left lung.  Musculoskeletal:         General: No swelling.      Cervical back: No rigidity or tenderness.      Right lower leg: No edema.      Left lower leg: No edema.   Lymphadenopathy:      Cervical: No cervical adenopathy.   Skin:     General: Skin is dry.      Comments: Hands cold   Neurological:      Mental Status: He is alert and oriented to person, place, and time.   Psychiatric:         Mood and Affect: Mood normal.         Behavior: Behavior normal.

## 2024-02-27 NOTE — ASSESSMENT & PLAN NOTE
Note apparent evidence of pulmonary hypertension on echo from August 2023.  No physical signs of this other than increased second heart sound at the left sternal border.

## 2024-02-27 NOTE — ASSESSMENT & PLAN NOTE
Pulmonary fibrosis and surprisingly patient remains relatively asymptomatic.  Association with previous diagnosis of sarcoidosis is hard to determine.  At this point I am just monitoring patient's PFTs on an irregular basis as he remains asymptomatic.  There seems to be no clear indication for antifibrotic therapy.  Tentative diagnosis at this point is idiopathic pulmonary fibrosis although the radiographic pattern is somewhat atypical.  I recommend patient RSV vaccine as it appears that he has not had this yet this year.

## 2024-03-14 ENCOUNTER — RA CDI HCC (OUTPATIENT)
Dept: OTHER | Facility: HOSPITAL | Age: 89
End: 2024-03-14

## 2024-03-20 ENCOUNTER — OFFICE VISIT (OUTPATIENT)
Dept: FAMILY MEDICINE CLINIC | Facility: CLINIC | Age: 89
End: 2024-03-20
Payer: MEDICARE

## 2024-03-20 VITALS
BODY MASS INDEX: 21.33 KG/M2 | DIASTOLIC BLOOD PRESSURE: 62 MMHG | SYSTOLIC BLOOD PRESSURE: 118 MMHG | HEART RATE: 70 BPM | RESPIRATION RATE: 16 BRPM | WEIGHT: 149 LBS | HEIGHT: 70 IN

## 2024-03-20 DIAGNOSIS — I25.10 CORONARY ARTERY DISEASE INVOLVING NATIVE CORONARY ARTERY OF NATIVE HEART WITHOUT ANGINA PECTORIS: ICD-10-CM

## 2024-03-20 DIAGNOSIS — R73.03 PREDIABETES: ICD-10-CM

## 2024-03-20 DIAGNOSIS — I27.20 PULMONARY HYPERTENSION (HCC): Primary | ICD-10-CM

## 2024-03-20 DIAGNOSIS — D86.9 SARCOIDOSIS: ICD-10-CM

## 2024-03-20 DIAGNOSIS — E78.2 MIXED HYPERLIPIDEMIA: ICD-10-CM

## 2024-03-20 DIAGNOSIS — J84.10 PULMONARY FIBROSIS (HCC): ICD-10-CM

## 2024-03-20 DIAGNOSIS — I10 PRIMARY HYPERTENSION: ICD-10-CM

## 2024-03-20 PROCEDURE — G0439 PPPS, SUBSEQ VISIT: HCPCS | Performed by: FAMILY MEDICINE

## 2024-03-20 PROCEDURE — 99214 OFFICE O/P EST MOD 30 MIN: CPT | Performed by: FAMILY MEDICINE

## 2024-03-20 NOTE — PROGRESS NOTES
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  Personalized health advice and appropriate referrals for health education or preventive services given if needed, as noted in patient's After Visit Summary.     History of Present Illness:     Patient presents for a Medicare Wellness Visit    HPI   Here to go over chronic issues and labs / imaging studies if applicable.       History of Present Illness  The patient is an 88-year-old male who presents for evaluation of multiple medical concerns.    He denies any problems with his breathing or lungs. He is not on any inhalers.    He sees a cardiologist and a pulmonologist. His cardiologist recommended him to use carpal tunnel braces, but he does not like them. He is scheduled for an echocardiogram next month. Dr. Zee is sending him for a blood test. He is on aspirin.    He inquired if he should get a COVID-19 vaccine every year. His last COVID-19 vaccine was in 06/2023. He denies any reactions to it.    Supplemental Information  He notices blood on the toilet paper occasionally.      Patient Care Team:  Buck García MD as PCP - General (Family Medicine)     Review of Systems:     Review of Systems   Constitutional:  Negative for activity change, appetite change and fever.   HENT:  Negative for congestion, nosebleeds and trouble swallowing.    Eyes:  Negative for itching.   Respiratory:  Negative for cough and chest tightness.    Cardiovascular:  Negative for chest pain and palpitations.   Gastrointestinal:  Negative for abdominal pain, constipation, diarrhea and nausea.   Endocrine: Negative  for cold intolerance.   Genitourinary:  Negative for frequency.   Musculoskeletal:  Negative for gait problem and joint swelling.   Skin:  Negative for rash.   Allergic/Immunologic: Negative for immunocompromised state.   Neurological:  Negative for dizziness, tremors, seizures, syncope and headaches.   Psychiatric/Behavioral:  Negative for hallucinations and suicidal ideas.         Problem List:     Patient Active Problem List   Diagnosis    Prediabetes    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 pectoris    Iron deficiency    Pulmonary hypertension (HCC)    Hypertension    Sarcoidosis    Pulmonary nodules    MVP (mitral valve prolapse)    Hyperlipidemia    Pulmonary fibrosis (HCC)      Past Medical and Surgical History:     Past Medical History:   Diagnosis Date    Cancer (HCC)     Lesion left calf removed    Hypertension     Pulmonary hypertension (HCC)      Past Surgical History:   Procedure Laterality Date    APPENDECTOMY      CATARACT EXTRACTION, BILATERAL      COLONOSCOPY  2018      Family History:     Family History   Problem Relation Age of Onset    Heart disease Father     Sudden death Father 72    COPD Neg Hx     Asthma Neg Hx     Lung cancer Neg Hx       Social History:     Social History     Socioeconomic History    Marital status:      Spouse name: None    Number of children: 2    Years of education: 12    Highest education level: None   Occupational History    Occupation: RETIRED   Tobacco Use    Smoking status: Never    Smokeless tobacco: Never   Vaping Use    Vaping status: Never Used   Substance and Sexual Activity    Alcohol use: None     Comment: NOne    Drug use: None     Comment: NONE    Sexual activity: None   Other Topics Concern    None   Social History Narrative    Most recent tobacco use screenin2020    Do you currently or have you served in the [place] [employer]: No    Were you activated, into active duty, as a member of the [employer] or as a  Reservist: No    Advance directive: No    Alcohol intake: None    Advance directive - Provider has reviewed directives and consents to follow them (insert provider name with any objections in notes field): No    Caffeine intake: None    Illicit drugs: none    Occupation: retired    Exercise level: Moderate    Single or multi-level home/work: single level home    Live alone or with others: alone    Chewing tobacco: none    Marital status:     Number of children: 2    Diet: Regular    Sexual orientation: Heterosexual    Smoke alarm in home: Yes    General stress level: Low    Sunscreen used routinely: No    Education: 12    Guns present in home: No    Presence of domestic violence: No    Passive smoke exposure: Yes    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 ; printing    Asbestos exposure: No    TB exposure: No    Environmental exposure: No    Animal exposure: No    Legally blind in one or both eyes: No     Social Determinants of Health     Financial Resource Strain: Low Risk  (3/20/2023)    Overall Financial Resource Strain (CARDIA)     Difficulty of Paying Living Expenses: Not hard at all   Food Insecurity: No Food Insecurity (3/20/2024)    Hunger Vital Sign     Worried About Running Out of Food in the Last Year: Never true     Ran Out of Food in the Last Year: Never true   Transportation Needs: No Transportation Needs (3/20/2024)    PRAPARE - Transportation     Lack of Transportation (Medical): No     Lack of Transportation (Non-Medical): No   Physical Activity: Not on file   Stress: Not on file   Social Connections: Not on file   Intimate Partner Violence: Not on file   Housing Stability: Low Risk  (3/20/2024)    Housing Stability Vital Sign     Unable to Pay for Housing in the Last Year: No     Number of Places Lived in the Last Year: 1     Unstable Housing in the Last Year: No      Medications and Allergies:     Current Outpatient Medications   Medication Sig Dispense Refill    Ascorbic Acid, Vitamin  C, (VITAMIN C) 100 MG tablet Take 500 mg by mouth daily        aspirin 325 mg tablet Take 325 mg by mouth every other day Take 1/2 of pill every other day       atorvastatin (LIPITOR) 40 mg tablet Take 1 tablet (40 mg total) by mouth daily 90 tablet 1    Coenzyme Q10 10 MG capsule Take 10 mg by mouth daily      Docosahexaenoic Acid--270 MG CAPS Take by mouth daily      Glucosamine-Chondroit-Vit C-Mn (Glucosamine Chondr 1500 Complx) CAPS Take by mouth daily      lisinopril (ZESTRIL) 2.5 mg tablet TAKE 1 TABLET BY MOUTH EVERY DAY      Lutein 10 MG TABS Take by mouth daily      multivitamin (THERAGRAN) TABS Take 1 tablet by mouth daily      PreviDent 5000 Booster Plus 1.1 % PSTE       SF 5000 Plus 1.1 % CREA BRUSH FOR 2 MINUTES ONCE DAILY AT BEDTIME. EXPECTORATE. NOTHING BY MOUTH 30 MINUTES      vitamin E, tocopherol, 200 units capsule Take 200 Units by mouth daily       No current facility-administered medications for this visit.     No Known Allergies   Immunizations:     Immunization History   Administered Date(s) Administered    COVID-19 MODERNA VACC 0.5 ML IM 03/02/2021, 03/30/2021, 11/04/2021, 04/22/2022    COVID-19 Moderna Vac BIVALENT 12 Yr+ IM 0.5 ML 07/01/2023    DTaP 08/11/2014    INFLUENZA 09/28/2018, 09/22/2022, 10/11/2023    Influenza, high dose seasonal 0.7 mL 11/06/2020, 10/24/2021    Pneumococcal Conjugate 13-Valent 03/23/2018    Pneumococcal Polysaccharide PPV23 08/11/2014    Tdap 08/11/2014      Health Maintenance:     There are no preventive care reminders to display for this patient.      Topic Date Due    COVID-19 Vaccine (6 - 2023-24 season) 09/01/2023      Medicare Screening Tests and Risk Assessments:     Alexi is here for his Subsequent Wellness visit. Last Medicare Wellness visit information reviewed, patient interviewed and updates made to the record today.      Health Risk Assessment:   Patient rates overall health as very good. Patient feels that their physical health rating is  same. Patient is very satisfied with their life. Eyesight was rated as same. Hearing was rated as same. Patient feels that their emotional and mental health rating is same. Patients states they are never, rarely angry. Patient states they are never, rarely unusually tired/fatigued. Pain experienced in the last 7 days has been none. Patient states that he has experienced no weight loss or gain in last 6 months.     Depression Screening:   PHQ-2 Score: 0      Fall Risk Screening:   In the past year, patient has experienced: no history of falling in past year      Home Safety:  Patient does not have trouble with stairs inside or outside of their home. Patient has working smoke alarms and has working carbon monoxide detector. Home safety hazards include: none.     Nutrition:   Current diet is Regular.     Medications:   Patient is currently taking over-the-counter supplements. OTC medications include: see medication list. Patient is able to manage medications.     Activities of Daily Living (ADLs)/Instrumental Activities of Daily Living (IADLs):   Walk and transfer into and out of bed and chair?: Yes  Dress and groom yourself?: Yes    Bathe or shower yourself?: Yes    Feed yourself? Yes  Do your laundry/housekeeping?: Yes  Manage your money, pay your bills and track your expenses?: Yes  Make your own meals?: Yes    Do your own shopping?: Yes    Previous Hospitalizations:   Any hospitalizations or ED visits within the last 12 months?: No      Advance Care Planning:   Living will: No    Durable POA for healthcare: No    Advanced directive: No    Five wishes given: No      Cognitive Screening:   Provider or family/friend/caregiver concerned regarding cognition?: No    PREVENTIVE SCREENINGS      Cardiovascular Screening:    General: Screening Not Indicated, History Lipid Disorder and Screening Current      Diabetes Screening:     General: Screening Current    Due for: Blood Glucose      Colorectal Cancer Screening:      "General: Screening Not Indicated      Prostate Cancer Screening:    General: Screening Not Indicated      Osteoporosis Screening:    General: Screening Not Indicated      Abdominal Aortic Aneurysm (AAA) Screening:        General: Screening Not Indicated      Lung Cancer Screening:     General: Screening Not Indicated      Hepatitis C Screening:      Hep C Screening Accepted: No     Screening, Brief Intervention, and Referral to Treatment (SBIRT)    Screening  Typical number of drinks in a day: 0  Typical number of drinks in a week: 0  Interpretation: Low risk drinking behavior.    Single Item Drug Screening:  How often have you used an illegal drug (including marijuana) or a prescription medication for non-medical reasons in the past year? never    Single Item Drug Screen Score: 0  Interpretation: Negative screen for possible drug use disorder    Brief Intervention  Alcohol & drug use screenings were reviewed. No concerns regarding substance use disorder identified.     No results found.     Physical Exam:     /62 (BP Location: Left arm, Patient Position: Sitting, Cuff Size: Standard)   Pulse 70   Resp 16   Ht 5' 10\" (1.778 m)   Wt 67.6 kg (149 lb)   BMI 21.38 kg/m²     Physical Exam  Vitals and nursing note reviewed.   Constitutional:       General: He is not in acute distress.     Appearance: He is well-developed.   HENT:      Head: Normocephalic and atraumatic.   Eyes:      Conjunctiva/sclera: Conjunctivae normal.   Cardiovascular:      Rate and Rhythm: Normal rate and regular rhythm.      Heart sounds: No murmur heard.  Pulmonary:      Effort: Pulmonary effort is normal. No respiratory distress.      Breath sounds: Normal breath sounds.   Abdominal:      Palpations: Abdomen is soft.      Tenderness: There is no abdominal tenderness.   Musculoskeletal:         General: No swelling.      Cervical back: Neck supple.   Skin:     General: Skin is warm and dry.      Capillary Refill: Capillary refill takes " less than 2 seconds.   Neurological:      Mental Status: He is alert.   Psychiatric:         Mood and Affect: Mood normal.          Buck García MD

## 2024-03-20 NOTE — PATIENT INSTRUCTIONS
Medicare Preventive Visit Patient Instructions  Thank you for completing your Welcome to Medicare Visit or Medicare Annual Wellness Visit today. Your next wellness visit will be due in one year (3/21/2025).  The screening/preventive services that you may require over the next 5-10 years are detailed below. Some tests may not apply to you based off risk factors and/or age. Screening tests ordered at today's visit but not completed yet may show as past due. Also, please note that scanned in results may not display below.  Preventive Screenings:  Service Recommendations Previous Testing/Comments   Colorectal Cancer Screening  Colonoscopy    Fecal Occult Blood Test (FOBT)/Fecal Immunochemical Test (FIT)  Fecal DNA/Cologuard Test  Flexible Sigmoidoscopy Age: 45-75 years old   Colonoscopy: every 10 years (May be performed more frequently if at higher risk)  OR  FOBT/FIT: every 1 year  OR  Cologuard: every 3 years  OR  Sigmoidoscopy: every 5 years  Screening may be recommended earlier than age 45 if at higher risk for colorectal cancer. Also, an individualized decision between you and your healthcare provider will decide whether screening between the ages of 76-85 would be appropriate. Colonoscopy: Not on file  FOBT/FIT: Not on file  Cologuard: Not on file  Sigmoidoscopy: Not on file    Screening Not Indicated     Prostate Cancer Screening Individualized decision between patient and health care provider in men between ages of 55-69   Medicare will cover every 12 months beginning on the day after your 50th birthday PSA: No results in last 5 years     Screening Not Indicated     Hepatitis C Screening Once for adults born between 1945 and 1965  More frequently in patients at high risk for Hepatitis C Hep C Antibody: Not on file        Diabetes Screening 1-2 times per year if you're at risk for diabetes or have pre-diabetes Fasting glucose: 96 mg/dL (8/11/2020)  A1C: 6.1 % (11/13/2023)  Screening Current  Due for Blood Glucose    Cholesterol Screening Once every 5 years if you don't have a lipid disorder. May order more often based on risk factors. Lipid panel: 10/07/2023  Screening Not Indicated  History Lipid Disorder      Other Preventive Screenings Covered by Medicare:  Abdominal Aortic Aneurysm (AAA) Screening: covered once if your at risk. You're considered to be at risk if you have a family history of AAA or a male between the age of 65-75 who smoking at least 100 cigarettes in your lifetime.  Lung Cancer Screening: covers low dose CT scan once per year if you meet all of the following conditions: (1) Age 55-77; (2) No signs or symptoms of lung cancer; (3) Current smoker or have quit smoking within the last 15 years; (4) You have a tobacco smoking history of at least 20 pack years (packs per day x number of years you smoked); (5) You get a written order from a healthcare provider.  Glaucoma Screening: covered annually if you're considered high risk: (1) You have diabetes OR (2) Family history of glaucoma OR (3)  aged 50 and older OR (4)  American aged 65 and older  Osteoporosis Screening: covered every 2 years if you meet one of the following conditions: (1) Have a vertebral abnormality; (2) On glucocorticoid therapy for more than 3 months; (3) Have primary hyperparathyroidism; (4) On osteoporosis medications and need to assess response to drug therapy.  HIV Screening: covered annually if you're between the age of 15-65. Also covered annually if you are younger than 15 and older than 65 with risk factors for HIV infection. For pregnant patients, it is covered up to 3 times per pregnancy.    Immunizations:  Immunization Recommendations   Influenza Vaccine Annual influenza vaccination during flu season is recommended for all persons aged >= 6 months who do not have contraindications   Pneumococcal Vaccine   * Pneumococcal conjugate vaccine = PCV13 (Prevnar 13), PCV15 (Vaxneuvance), PCV20 (Prevnar 20)  *  Pneumococcal polysaccharide vaccine = PPSV23 (Pneumovax) Adults 19-63 yo with certain risk factors or if 65+ yo  If never received any pneumonia vaccine: recommend Prevnar 20 (PCV20)  Give PCV20 if previously received 1 dose of PCV13 or PPSV23   Hepatitis B Vaccine 3 dose series if at intermediate or high risk (ex: diabetes, end stage renal disease, liver disease)   Respiratory syncytial virus (RSV) Vaccine - COVERED BY MEDICARE PART D  * RSVPreF3 (Arexvy) CDC recommends that adults 60 years of age and older may receive a single dose of RSV vaccine using shared clinical decision-making (SCDM)   Tetanus (Td) Vaccine - COST NOT COVERED BY MEDICARE PART B Following completion of primary series, a booster dose should be given every 10 years to maintain immunity against tetanus. Td may also be given as tetanus wound prophylaxis.   Tdap Vaccine - COST NOT COVERED BY MEDICARE PART B Recommended at least once for all adults. For pregnant patients, recommended with each pregnancy.   Shingles Vaccine (Shingrix) - COST NOT COVERED BY MEDICARE PART B  2 shot series recommended in those 19 years and older who have or will have weakened immune systems or those 50 years and older     Health Maintenance Due:  There are no preventive care reminders to display for this patient.  Immunizations Due:      Topic Date Due   • COVID-19 Vaccine (6 - 2023-24 season) 09/01/2023     Advance Directives   What are advance directives?  Advance directives are legal documents that state your wishes and plans for medical care. These plans are made ahead of time in case you lose your ability to make decisions for yourself. Advance directives can apply to any medical decision, such as the treatments you want, and if you want to donate organs.   What are the types of advance directives?  There are many types of advance directives, and each state has rules about how to use them. You may choose a combination of any of the following:  Living will:  This  is a written record of the treatment you want. You can also choose which treatments you do not want, which to limit, and which to stop at a certain time. This includes surgery, medicine, IV fluid, and tube feedings.   Durable power of  for healthcare (DPAHC):  This is a written record that states who you want to make healthcare choices for you when you are unable to make them for yourself. This person, called a proxy, is usually a family member or a friend. You may choose more than 1 proxy.  Do not resuscitate (DNR) order:  A DNR order is used in case your heart stops beating or you stop breathing. It is a request not to have certain forms of treatment, such as CPR. A DNR order may be included in other types of advance directives.  Medical directive:  This covers the care that you want if you are in a coma, near death, or unable to make decisions for yourself. You can list the treatments you want for each condition. Treatment may include pain medicine, surgery, blood transfusions, dialysis, IV or tube feedings, and a ventilator (breathing machine).  Values history:  This document has questions about your views, beliefs, and how you feel and think about life. This information can help others choose the care that you would choose.  Why are advance directives important?  An advance directive helps you control your care. Although spoken wishes may be used, it is better to have your wishes written down. Spoken wishes can be misunderstood, or not followed. Treatments may be given even if you do not want them. An advance directive may make it easier for your family to make difficult choices about your care.       © Copyright [employer] 2018 Information is for End User's use only and may not be sold, redistributed or otherwise used for commercial purposes. All illustrations and images included in CareNotes® are the copyrighted property of [employer] or [employer]

## 2024-09-09 ENCOUNTER — OFFICE VISIT (OUTPATIENT)
Dept: FAMILY MEDICINE CLINIC | Facility: CLINIC | Age: 89
End: 2024-09-09
Payer: MEDICARE

## 2024-09-09 VITALS
RESPIRATION RATE: 16 BRPM | BODY MASS INDEX: 21.19 KG/M2 | HEIGHT: 70 IN | DIASTOLIC BLOOD PRESSURE: 74 MMHG | WEIGHT: 148 LBS | HEART RATE: 55 BPM | OXYGEN SATURATION: 95 % | SYSTOLIC BLOOD PRESSURE: 118 MMHG

## 2024-09-09 DIAGNOSIS — I10 PRIMARY HYPERTENSION: ICD-10-CM

## 2024-09-09 DIAGNOSIS — I25.10 CORONARY ARTERY DISEASE INVOLVING NATIVE CORONARY ARTERY OF NATIVE HEART WITHOUT ANGINA PECTORIS: ICD-10-CM

## 2024-09-09 DIAGNOSIS — I27.20 PULMONARY HYPERTENSION (HCC): ICD-10-CM

## 2024-09-09 DIAGNOSIS — D86.9 SARCOIDOSIS: ICD-10-CM

## 2024-09-09 DIAGNOSIS — E78.2 MIXED HYPERLIPIDEMIA: ICD-10-CM

## 2024-09-09 DIAGNOSIS — Z79.899 OTHER LONG TERM (CURRENT) DRUG THERAPY: Primary | ICD-10-CM

## 2024-09-09 DIAGNOSIS — R73.03 PREDIABETES: ICD-10-CM

## 2024-09-09 DIAGNOSIS — I34.1 MVP (MITRAL VALVE PROLAPSE): ICD-10-CM

## 2024-09-09 DIAGNOSIS — J84.10 PULMONARY FIBROSIS (HCC): ICD-10-CM

## 2024-09-09 PROCEDURE — 99214 OFFICE O/P EST MOD 30 MIN: CPT | Performed by: FAMILY MEDICINE

## 2024-09-09 PROCEDURE — G2211 COMPLEX E/M VISIT ADD ON: HCPCS | Performed by: FAMILY MEDICINE

## 2024-09-09 NOTE — ASSESSMENT & PLAN NOTE
Orders:    CBC and differential; Future    Comprehensive metabolic panel; Future    Magnesium; Future

## 2024-09-09 NOTE — PROGRESS NOTES
Ambulatory Visit  Name: Alexi Del Rosario      : 1935      MRN: [id number]  Encounter Provider: Buck García MD  Encounter Date: 2024   Encounter department: [facility]    Assessment & Plan  1. Pulmonary Hypertension.  He has a history of pulmonary hypertension with a pulmonary artery pressure of 63 recorded in 2023. The cardiologist suggested discussing sleep apnea due to its potential impact on lung pressures and blood pressure. However, testing for sleep apnea is not deemed necessary at this time. An echocardiogram will be scheduled next year to monitor lung pressures.    2. Mitral Valve Issues.  He has a known mitral valve issue. No new symptoms or changes in condition were reported. Continued monitoring is recommended.    3. Pulmonary Fibrosis.  He has a history of pulmonary fibrosis. Dr. Griffin previously recommended monitoring his condition and considering antifibrotic therapy if there is a decline in function. Currently, his breathing is stable with no reported issues. Continued monitoring is advised.    4. Arthritis of the Pinky Finger.  He reports deformity in his pinky finger for the past month and a half, likely due to arthritis. A referral to a hand specialist at [facility] for a possible corticosteroid injection was discussed, but he prefers to manage the condition without intervention for now.    5. Health Maintenance.  His last blood work from 2024 was satisfactory. A blood test will be conducted in 2024 to assess sugar levels, blood count, kidney function, and magnesium levels.        Assessment & Plan  Primary hypertension    Orders:    CBC and differential; Future    Comprehensive metabolic panel; Future    Magnesium; Future    Pulmonary fibrosis (HCC)    Orders:    CBC and differential; Future    Comprehensive metabolic panel; Future    Magnesium; Future    Coronary artery disease involving native coronary artery of native heart  without angina pectoris    Orders:    CBC and differential; Future    Comprehensive metabolic panel; Future    Magnesium; Future    Other long term (current) drug therapy    Orders:    Hemoglobin A1C; Future    CBC and differential; Future    Comprehensive metabolic panel; Future    Magnesium; Future    Pulmonary hypertension (HCC)    Orders:    CBC and differential; Future    Comprehensive metabolic panel; Future    Magnesium; Future    Prediabetes    Orders:    CBC and differential; Future    Comprehensive metabolic panel; Future    Magnesium; Future    Sarcoidosis    Orders:    CBC and differential; Future    Comprehensive metabolic panel; Future    Magnesium; Future    Mixed hyperlipidemia    Orders:    CBC and differential; Future    Comprehensive metabolic panel; Future    Magnesium; Future    MVP (mitral valve prolapse)    Orders:    CBC and differential; Future    Comprehensive metabolic panel; Future    Magnesium; Future         History of Present Illness     History of Present Illness  The patient presents for a follow-up visit.    He maintains an active lifestyle, including regular exercise and cycling. His blood pressure readings vary depending on the healthcare provider. He reports no respiratory issues.    He had a consultation with his cardiologist today, who advised him to take aspirin with breakfast and suggested the use of carpal tunnel braces at night. The cardiologist also recommended discussing potential sleep apnea with me. He occasionally experiences difficulty falling asleep but does not report any other sleep-related issues. An echocardiogram was scheduled for him, but it was subsequently cancelled.    He has arthritis in his pinky finger for about 1.5 months.     Review of Systems   Constitutional:  Negative for activity change, appetite change and fever.   HENT:  Negative for congestion, nosebleeds and trouble swallowing.    Eyes:  Negative for itching.   Respiratory:  Negative for cough  "and chest tightness.    Cardiovascular:  Negative for chest pain and palpitations.   Gastrointestinal:  Negative for abdominal pain, constipation, diarrhea and nausea.   Endocrine: Negative for cold intolerance.   Genitourinary:  Negative for frequency.   Musculoskeletal:  Negative for gait problem and joint swelling.   Skin:  Negative for rash.   Allergic/Immunologic: Negative for immunocompromised state.   Neurological:  Negative for dizziness, tremors, seizures, syncope and headaches.   Psychiatric/Behavioral:  Negative for hallucinations and suicidal ideas.      Objective     /74 (BP Location: Left arm, Patient Position: Sitting, Cuff Size: Standard)   Pulse 55   Resp 16   Ht 5' 10\" (1.778 m)   Wt 67.1 kg (148 lb)   SpO2 95%   BMI 21.24 kg/m²     Physical Exam  Normal lung function.  Physical Exam  Vitals and nursing note reviewed.   Constitutional:       Appearance: He is well-developed.   HENT:      Head: Normocephalic and atraumatic.   Eyes:      Conjunctiva/sclera: Conjunctivae normal.      Pupils: Pupils are equal, round, and reactive to light.   Cardiovascular:      Rate and Rhythm: Normal rate and regular rhythm.      Heart sounds: Normal heart sounds.   Pulmonary:      Effort: Pulmonary effort is normal.      Breath sounds: Normal breath sounds. No wheezing or rales.   Abdominal:      General: Bowel sounds are normal. There is no distension.      Palpations: Abdomen is soft.      Tenderness: There is no abdominal tenderness.   Musculoskeletal:         General: No tenderness. Normal range of motion.      Cervical back: Normal range of motion and neck supple.   Skin:     General: Skin is warm and dry.      Findings: No rash.   Neurological:      Mental Status: He is alert and oriented to person, place, and time.      Cranial Nerves: No cranial nerve deficit.      Sensory: No sensory deficit.      Coordination: Coordination normal.   Psychiatric:         Behavior: Behavior normal.         " Thought Content: Thought content normal.         Judgment: Judgment normal.

## 2025-03-10 ENCOUNTER — RA CDI HCC (OUTPATIENT)
Dept: OTHER | Facility: HOSPITAL | Age: 89
End: 2025-03-10

## 2025-03-27 ENCOUNTER — APPOINTMENT (OUTPATIENT)
Dept: LAB | Facility: CLINIC | Age: 89
End: 2025-03-27
Payer: MEDICARE

## 2025-03-27 ENCOUNTER — OFFICE VISIT (OUTPATIENT)
Dept: FAMILY MEDICINE CLINIC | Facility: CLINIC | Age: 89
End: 2025-03-27
Payer: MEDICARE

## 2025-03-27 VITALS
SYSTOLIC BLOOD PRESSURE: 125 MMHG | DIASTOLIC BLOOD PRESSURE: 72 MMHG | HEIGHT: 70 IN | HEART RATE: 56 BPM | OXYGEN SATURATION: 97 % | WEIGHT: 138 LBS | BODY MASS INDEX: 19.76 KG/M2

## 2025-03-27 DIAGNOSIS — R73.03 PREDIABETES: ICD-10-CM

## 2025-03-27 DIAGNOSIS — Z79.899 OTHER LONG TERM (CURRENT) DRUG THERAPY: ICD-10-CM

## 2025-03-27 DIAGNOSIS — R09.89 RHONCHI AT LEFT LUNG BASE: ICD-10-CM

## 2025-03-27 DIAGNOSIS — K52.9 CHRONIC DIARRHEA: ICD-10-CM

## 2025-03-27 DIAGNOSIS — I27.20 PULMONARY HYPERTENSION (HCC): ICD-10-CM

## 2025-03-27 DIAGNOSIS — I10 PRIMARY HYPERTENSION: ICD-10-CM

## 2025-03-27 DIAGNOSIS — E78.2 MIXED HYPERLIPIDEMIA: ICD-10-CM

## 2025-03-27 DIAGNOSIS — I25.10 CORONARY ARTERY DISEASE INVOLVING NATIVE CORONARY ARTERY OF NATIVE HEART WITHOUT ANGINA PECTORIS: ICD-10-CM

## 2025-03-27 DIAGNOSIS — E61.1 IRON DEFICIENCY: ICD-10-CM

## 2025-03-27 DIAGNOSIS — D86.9 SARCOIDOSIS: ICD-10-CM

## 2025-03-27 DIAGNOSIS — J84.10 PULMONARY FIBROSIS (HCC): ICD-10-CM

## 2025-03-27 DIAGNOSIS — E55.9 VITAMIN D DEFICIENCY: Primary | ICD-10-CM

## 2025-03-27 DIAGNOSIS — Z00.00 WELL ADULT EXAM: ICD-10-CM

## 2025-03-27 DIAGNOSIS — E53.8 B12 DEFICIENCY: ICD-10-CM

## 2025-03-27 DIAGNOSIS — I34.1 MVP (MITRAL VALVE PROLAPSE): ICD-10-CM

## 2025-03-27 LAB
25(OH)D3 SERPL-MCNC: 55.1 NG/ML (ref 30–100)
ALBUMIN SERPL BCG-MCNC: 3.7 G/DL (ref 3.5–5)
ALP SERPL-CCNC: 125 U/L (ref 34–104)
ALT SERPL W P-5'-P-CCNC: 17 U/L (ref 7–52)
ANION GAP SERPL CALCULATED.3IONS-SCNC: 8 MMOL/L (ref 4–13)
AST SERPL W P-5'-P-CCNC: 27 U/L (ref 13–39)
BASOPHILS # BLD AUTO: 0.08 THOUSANDS/ÂΜL (ref 0–0.1)
BASOPHILS NFR BLD AUTO: 1 % (ref 0–1)
BILIRUB SERPL-MCNC: 0.44 MG/DL (ref 0.2–1)
BILIRUB UR QL STRIP: NEGATIVE
BUN SERPL-MCNC: 29 MG/DL (ref 5–25)
CALCIUM SERPL-MCNC: 9.2 MG/DL (ref 8.4–10.2)
CHLORIDE SERPL-SCNC: 107 MMOL/L (ref 96–108)
CLARITY UR: CLEAR
CO2 SERPL-SCNC: 26 MMOL/L (ref 21–32)
COLOR UR: YELLOW
CREAT SERPL-MCNC: 1.17 MG/DL (ref 0.6–1.3)
EOSINOPHIL # BLD AUTO: 0.27 THOUSAND/ÂΜL (ref 0–0.61)
EOSINOPHIL NFR BLD AUTO: 3 % (ref 0–6)
ERYTHROCYTE [DISTWIDTH] IN BLOOD BY AUTOMATED COUNT: 13.8 % (ref 11.6–15.1)
EST. AVERAGE GLUCOSE BLD GHB EST-MCNC: 131 MG/DL
FERRITIN SERPL-MCNC: 43 NG/ML (ref 24–336)
GFR SERPL CREATININE-BSD FRML MDRD: 55 ML/MIN/1.73SQ M
GLUCOSE P FAST SERPL-MCNC: 106 MG/DL (ref 65–99)
GLUCOSE UR STRIP-MCNC: NEGATIVE MG/DL
HBA1C MFR BLD: 6.2 %
HCT VFR BLD AUTO: 39.4 % (ref 36.5–49.3)
HGB BLD-MCNC: 12.4 G/DL (ref 12–17)
HGB UR QL STRIP.AUTO: NEGATIVE
IMM GRANULOCYTES # BLD AUTO: 0.05 THOUSAND/UL (ref 0–0.2)
IMM GRANULOCYTES NFR BLD AUTO: 1 % (ref 0–2)
IRON SATN MFR SERPL: 22 % (ref 15–50)
IRON SERPL-MCNC: 68 UG/DL (ref 50–212)
KETONES UR STRIP-MCNC: NEGATIVE MG/DL
LEUKOCYTE ESTERASE UR QL STRIP: NEGATIVE
LYMPHOCYTES # BLD AUTO: 1.46 THOUSANDS/ÂΜL (ref 0.6–4.47)
LYMPHOCYTES NFR BLD AUTO: 15 % (ref 14–44)
MAGNESIUM SERPL-MCNC: 2.1 MG/DL (ref 1.9–2.7)
MCH RBC QN AUTO: 30.7 PG (ref 26.8–34.3)
MCHC RBC AUTO-ENTMCNC: 31.5 G/DL (ref 31.4–37.4)
MCV RBC AUTO: 98 FL (ref 82–98)
MONOCYTES # BLD AUTO: 0.73 THOUSAND/ÂΜL (ref 0.17–1.22)
MONOCYTES NFR BLD AUTO: 8 % (ref 4–12)
NEUTROPHILS # BLD AUTO: 7.01 THOUSANDS/ÂΜL (ref 1.85–7.62)
NEUTS SEG NFR BLD AUTO: 72 % (ref 43–75)
NITRITE UR QL STRIP: NEGATIVE
NRBC BLD AUTO-RTO: 0 /100 WBCS
PH UR STRIP.AUTO: 5 [PH]
PLATELET # BLD AUTO: 272 THOUSANDS/UL (ref 149–390)
PMV BLD AUTO: 9.9 FL (ref 8.9–12.7)
POTASSIUM SERPL-SCNC: 5.2 MMOL/L (ref 3.5–5.3)
PROT SERPL-MCNC: 6.9 G/DL (ref 6.4–8.4)
PROT UR STRIP-MCNC: NEGATIVE MG/DL
RBC # BLD AUTO: 4.04 MILLION/UL (ref 3.88–5.62)
SODIUM SERPL-SCNC: 141 MMOL/L (ref 135–147)
SP GR UR STRIP.AUTO: 1.02 (ref 1–1.03)
TIBC SERPL-MCNC: 309.4 UG/DL (ref 250–450)
TRANSFERRIN SERPL-MCNC: 221 MG/DL (ref 203–362)
TSH SERPL DL<=0.05 MIU/L-ACNC: 1.99 UIU/ML (ref 0.45–4.5)
UIBC SERPL-MCNC: 241 UG/DL (ref 155–355)
UROBILINOGEN UR STRIP-ACNC: <2 MG/DL
VIT B12 SERPL-MCNC: 559 PG/ML (ref 180–914)
WBC # BLD AUTO: 9.6 THOUSAND/UL (ref 4.31–10.16)

## 2025-03-27 PROCEDURE — 99214 OFFICE O/P EST MOD 30 MIN: CPT | Performed by: FAMILY MEDICINE

## 2025-03-27 PROCEDURE — 82607 VITAMIN B-12: CPT | Performed by: FAMILY MEDICINE

## 2025-03-27 PROCEDURE — 82306 VITAMIN D 25 HYDROXY: CPT | Performed by: FAMILY MEDICINE

## 2025-03-27 PROCEDURE — 84443 ASSAY THYROID STIM HORMONE: CPT | Performed by: FAMILY MEDICINE

## 2025-03-27 PROCEDURE — 85025 COMPLETE CBC W/AUTO DIFF WBC: CPT | Performed by: FAMILY MEDICINE

## 2025-03-27 PROCEDURE — 83540 ASSAY OF IRON: CPT | Performed by: FAMILY MEDICINE

## 2025-03-27 PROCEDURE — 83735 ASSAY OF MAGNESIUM: CPT | Performed by: FAMILY MEDICINE

## 2025-03-27 PROCEDURE — G2211 COMPLEX E/M VISIT ADD ON: HCPCS | Performed by: FAMILY MEDICINE

## 2025-03-27 PROCEDURE — G0439 PPPS, SUBSEQ VISIT: HCPCS | Performed by: FAMILY MEDICINE

## 2025-03-27 PROCEDURE — 81003 URINALYSIS AUTO W/O SCOPE: CPT

## 2025-03-27 PROCEDURE — 83550 IRON BINDING TEST: CPT | Performed by: FAMILY MEDICINE

## 2025-03-27 PROCEDURE — 36415 COLL VENOUS BLD VENIPUNCTURE: CPT | Performed by: FAMILY MEDICINE

## 2025-03-27 PROCEDURE — 83036 HEMOGLOBIN GLYCOSYLATED A1C: CPT

## 2025-03-27 PROCEDURE — 82728 ASSAY OF FERRITIN: CPT | Performed by: FAMILY MEDICINE

## 2025-03-27 PROCEDURE — 80053 COMPREHEN METABOLIC PANEL: CPT | Performed by: FAMILY MEDICINE

## 2025-03-27 NOTE — ASSESSMENT & PLAN NOTE
Orders:    Ova and parasite examination; Future    Stool Enteric Bacterial Panel by PCR; Future    Clostridioides difficile toxin by PCR with EIA; Future    Reducing substances, stool; Future    Pancreatic elastase, fecal; Future    CBC and differential    Comprehensive metabolic panel    Vitamin D 25 hydroxy    Vitamin B12    TSH, 3rd generation with Free T4 reflex    UA w Reflex to Microscopic w Reflex to Culture; Future    Iron Panel (Includes Ferritin, Iron Sat%, Iron, and TIBC)    Magnesium

## 2025-03-27 NOTE — PROGRESS NOTES
Name: Alexi Del Rosario      : 1935      MRN: [id number]  Encounter Provider: Buck García MD  Encounter Date: 3/27/2025   Encounter department: [facility]    Assessment & Plan  Well adult exam         Vitamin D deficiency    Orders:    Vitamin D 25 hydroxy    B12 deficiency    Orders:    Vitamin B12    Iron deficiency    Orders:    Iron Panel (Includes Ferritin, Iron Sat%, Iron, and TIBC)    Chronic diarrhea    Orders:    Ova and parasite examination; Future    Stool Enteric Bacterial Panel by PCR; Future    Clostridioides difficile toxin by PCR with EIA; Future    Reducing substances, stool; Future    Pancreatic elastase, fecal; Future    CBC and differential    Comprehensive metabolic panel    Vitamin D 25 hydroxy    Vitamin B12    TSH, 3rd generation with Free T4 reflex    UA w Reflex to Microscopic w Reflex to Culture; Future    Iron Panel (Includes Ferritin, Iron Sat%, Iron, and TIBC)    Magnesium    Primary hypertension    Orders:    Ova and parasite examination; Future    Stool Enteric Bacterial Panel by PCR; Future    Clostridioides difficile toxin by PCR with EIA; Future    Reducing substances, stool; Future    Pancreatic elastase, fecal; Future    CBC and differential    Comprehensive metabolic panel    Vitamin D 25 hydroxy    Vitamin B12    TSH, 3rd generation with Free T4 reflex    UA w Reflex to Microscopic w Reflex to Culture; Future    Iron Panel (Includes Ferritin, Iron Sat%, Iron, and TIBC)    Magnesium    Pulmonary fibrosis (HCC)         Coronary artery disease involving native coronary artery of native heart without angina pectoris         Pulmonary hypertension (HCC)         Prediabetes         Sarcoidosis         Mixed hyperlipidemia         MVP (mitral valve prolapse)         Rhonchi at left lung base    Orders:    XR chest pa and lateral; Future    Assessment & Plan  1. Diarrhea.  He has been experiencing intermittent diarrhea for a couple of months, accompanied  by weight loss. His weight has decreased from 148 to 138 over the past 6 months. Stool studies will be conducted to test for bacteria and other potential causes. A blood test and urine test will also be performed. He is advised to discontinue fish oil and vitamin E supplements, as they may be contributing to his symptoms. If the stool studies return normal results, a referral to a gastroenterologist will be considered.    2. Wheezing.  He reports a recent cold with residual wheezing. Rhonchi were noted at the left lung base. A chest x-ray will be ordered to further evaluate his condition.    Follow-up  The patient will follow up in 2 weeks.       Preventive health issues were discussed with patient, and age appropriate screening tests were ordered as noted in patient's After Visit Summary. Personalized health advice and appropriate referrals for health education or preventive services given if needed, as noted in patient's After Visit Summary.    History of Present Illness     HPI     History of Present Illness  The patient presents for evaluation of diarrhea.    He has been experiencing intermittent diarrhea for the past few months, which he suspects may be due to a gastrointestinal infection. Concurrently, he has observed a significant weight loss. He is currently on a regimen of fish oil, vitamin C, and vitamin E supplements.    He also reports a recent history of a cold, accompanied by a mild wheeze.    MEDICATIONS  - Fish oil  - Vitamin C  - Vitamin E  - Aspirin      Patient Care Team:  Buck García MD as PCP - General (Family Medicine)    Review of Systems   Constitutional:  Positive for appetite change.   Gastrointestinal:  Positive for diarrhea.     Medical History Reviewed by provider this encounter:  Tobacco  Allergies  Meds  Problems  Med Hx  Surg Hx  Fam Hx       Annual Wellness Visit Questionnaire   Alexi is here for his Subsequent Wellness visit. Last Medicare Wellness visit information  reviewed, patient interviewed and updates made to the record today.      Health Risk Assessment:   Patient rates overall health as very good. Patient feels that their physical health rating is same. Patient is very satisfied with their life. Eyesight was rated as same. Hearing was rated as same. Patient feels that their emotional and mental health rating is same. Patients states they are never, rarely angry. Patient states they are never, rarely unusually tired/fatigued. Pain experienced in the last 7 days has been none. Patient states that he has experienced no weight loss or gain in last 6 months.     Depression Screening:   PHQ-2 Score: 0      Fall Risk Screening:   In the past year, patient has experienced: no history of falling in past year      Home Safety:  Patient does not have trouble with stairs inside or outside of their home. Patient has working smoke alarms and has working carbon monoxide detector. Home safety hazards include: none.     Nutrition:   Current diet is Regular.     Medications:   Patient is currently taking over-the-counter supplements. OTC medications include: see medication list. Patient is able to manage medications.     Activities of Daily Living (ADLs)/Instrumental Activities of Daily Living (IADLs):   Walk and transfer into and out of bed and chair?: Yes  Dress and groom yourself?: Yes    Bathe or shower yourself?: Yes    Feed yourself? Yes  Do your laundry/housekeeping?: Yes  Manage your money, pay your bills and track your expenses?: Yes  Make your own meals?: Yes    Do your own shopping?: Yes    Previous Hospitalizations:   Any hospitalizations or ED visits within the last 12 months?: No      Advance Care Planning:   Living will: No    Durable POA for healthcare: No    Advanced directive: No    Five wishes given: No      Cognitive Screening:   Provider or family/friend/caregiver concerned regarding cognition?: No    PREVENTIVE SCREENINGS      Cardiovascular Screening:    General:  Screening Not Indicated, History Lipid Disorder and Screening Current      Diabetes Screening:     General: Screening Current    Due for: Blood Glucose      Colorectal Cancer Screening:     General: Screening Not Indicated      Prostate Cancer Screening:    General: Screening Not Indicated      Osteoporosis Screening:    General: Screening Not Indicated      Abdominal Aortic Aneurysm (AAA) Screening:        General: Screening Not Indicated      Lung Cancer Screening:     General: Screening Not Indicated      Hepatitis C Screening:      Hep C Screening Accepted: No     Screening, Brief Intervention, and Referral to Treatment (SBIRT)     Screening  Typical number of drinks in a day: 0  Typical number of drinks in a week: 0  Interpretation: Low risk drinking behavior.    Single Item Drug Screening:  How often have you used an illegal drug (including marijuana) or a prescription medication for non-medical reasons in the past year? never    Single Item Drug Screen Score: 0  Interpretation: Negative screen for possible drug use disorder    Brief Intervention  Alcohol & drug use screenings were reviewed. No concerns regarding substance use disorder identified.     Social Drivers of Health     Financial Resource Strain: Low Risk  (3/20/2023)    Overall Financial Resource Strain (CARDIA)     Difficulty of Paying Living Expenses: Not hard at all   Food Insecurity: No Food Insecurity (3/27/2025)    Hunger Vital Sign     Worried About Running Out of Food in the Last Year: Never true     Ran Out of Food in the Last Year: Never true   Transportation Needs: No Transportation Needs (3/27/2025)    PRAPARE - Transportation     Lack of Transportation (Medical): No     Lack of Transportation (Non-Medical): No   Housing Stability: Low Risk  (3/27/2025)    Housing Stability Vital Sign     Unable to Pay for Housing in the Last Year: No     Number of Times Moved in the Last Year: 0     Homeless in the Last Year: No   Utilities: Not At Risk  "(3/27/2025)    [facility] Utilities     Threatened with loss of utilities: No     No results found.    Objective   /72   Pulse 56   Ht 5' 10\" (1.778 m)   Wt 62.6 kg (138 lb)   SpO2 97%   BMI 19.80 kg/m²     Physical Exam  Vitals and nursing note reviewed.   Constitutional:       Appearance: He is well-developed.   HENT:      Head: Normocephalic and atraumatic.   Eyes:      Conjunctiva/sclera: Conjunctivae normal.      Pupils: Pupils are equal, round, and reactive to light.   Cardiovascular:      Rate and Rhythm: Normal rate and regular rhythm.      Heart sounds: Normal heart sounds.   Pulmonary:      Effort: Pulmonary effort is normal.      Breath sounds: Normal breath sounds. No wheezing or rales.   Abdominal:      General: Bowel sounds are normal. There is no distension.      Palpations: Abdomen is soft.      Tenderness: There is no abdominal tenderness.   Musculoskeletal:         General: No tenderness. Normal range of motion.      Cervical back: Normal range of motion and neck supple.   Skin:     General: Skin is warm and dry.      Findings: No rash.   Neurological:      Mental Status: He is alert and oriented to person, place, and time.      Cranial Nerves: No cranial nerve deficit.      Sensory: No sensory deficit.      Coordination: Coordination normal.   Psychiatric:         Behavior: Behavior normal.         Thought Content: Thought content normal.         Judgment: Judgment normal.         "

## 2025-03-27 NOTE — PATIENT INSTRUCTIONS
Medicare Preventive Visit Patient Instructions  Thank you for completing your Welcome to Medicare Visit or Medicare Annual Wellness Visit today. Your next wellness visit will be due in one year (3/28/2026).  The screening/preventive services that you may require over the next 5-10 years are detailed below. Some tests may not apply to you based off risk factors and/or age. Screening tests ordered at today's visit but not completed yet may show as past due. Also, please note that scanned in results may not display below.  Preventive Screenings:  Service Recommendations Previous Testing/Comments   Colorectal Cancer Screening  Colonoscopy    Fecal Occult Blood Test (FOBT)/Fecal Immunochemical Test (FIT)  Fecal DNA/Cologuard Test  Flexible Sigmoidoscopy Age: 45-75 years old   Colonoscopy: every 10 years (May be performed more frequently if at higher risk)  OR  FOBT/FIT: every 1 year  OR  Cologuard: every 3 years  OR  Sigmoidoscopy: every 5 years  Screening may be recommended earlier than age 45 if at higher risk for colorectal cancer. Also, an individualized decision between you and your healthcare provider will decide whether screening between the ages of 76-85 would be appropriate. Colonoscopy: Not on file  FOBT/FIT: Not on file  Cologuard: Not on file  Sigmoidoscopy: Not on file    Screening Not Indicated     Prostate Cancer Screening Individualized decision between patient and health care provider in men between ages of 55-69   Medicare will cover every 12 months beginning on the day after your 50th birthday PSA: No results in last 5 years     Screening Not Indicated     Hepatitis C Screening Once for adults born between 1945 and 1965  More frequently in patients at high risk for Hepatitis C Hep C Antibody: Not on file        Diabetes Screening 1-2 times per year if you're at risk for diabetes or have pre-diabetes Fasting glucose: 96 mg/dL (8/11/2020)  A1C: 6.1 % (11/13/2023)  Screening Current  Due for Blood Glucose    Cholesterol Screening Once every 5 years if you don't have a lipid disorder. May order more often based on risk factors. Lipid panel: 10/09/2024  Screening Not Indicated  History Lipid Disorder  Screening Current      Other Preventive Screenings Covered by Medicare:  Abdominal Aortic Aneurysm (AAA) Screening: covered once if your at risk. You're considered to be at risk if you have a family history of AAA or a male between the age of 65-75 who smoking at least 100 cigarettes in your lifetime.  Lung Cancer Screening: covers low dose CT scan once per year if you meet all of the following conditions: (1) Age 55-77; (2) No signs or symptoms of lung cancer; (3) Current smoker or have quit smoking within the last 15 years; (4) You have a tobacco smoking history of at least 20 pack years (packs per day x number of years you smoked); (5) You get a written order from a healthcare provider.  Glaucoma Screening: covered annually if you're considered high risk: (1) You have diabetes OR (2) Family history of glaucoma OR (3)  aged 50 and older OR (4)  American aged 65 and older  Osteoporosis Screening: covered every 2 years if you meet one of the following conditions: (1) Have a vertebral abnormality; (2) On glucocorticoid therapy for more than 3 months; (3) Have primary hyperparathyroidism; (4) On osteoporosis medications and need to assess response to drug therapy.  HIV Screening: covered annually if you're between the age of 15-65. Also covered annually if you are younger than 15 and older than 65 with risk factors for HIV infection. For pregnant patients, it is covered up to 3 times per pregnancy.    Immunizations:  Immunization Recommendations   Influenza Vaccine Annual influenza vaccination during flu season is recommended for all persons aged >= 6 months who do not have contraindications   Pneumococcal Vaccine   * Pneumococcal conjugate vaccine = PCV13 (Prevnar 13), PCV15 (Vaxneuvance), PCV20  (Prevnar 20)  * Pneumococcal polysaccharide vaccine = PPSV23 (Pneumovax) Adults 19-63 yo with certain risk factors or if 65+ yo  If never received any pneumonia vaccine: recommend Prevnar 20 (PCV20)  Give PCV20 if previously received 1 dose of PCV13 or PPSV23   Hepatitis B Vaccine 3 dose series if at intermediate or high risk (ex: diabetes, end stage renal disease, liver disease)   Respiratory syncytial virus (RSV) Vaccine - COVERED BY MEDICARE PART D  * RSVPreF3 (Arexvy) CDC recommends that adults 60 years of age and older may receive a single dose of RSV vaccine using shared clinical decision-making (SCDM)   Tetanus (Td) Vaccine - COST NOT COVERED BY MEDICARE PART B Following completion of primary series, a booster dose should be given every 10 years to maintain immunity against tetanus. Td may also be given as tetanus wound prophylaxis.   Tdap Vaccine - COST NOT COVERED BY MEDICARE PART B Recommended at least once for all adults. For pregnant patients, recommended with each pregnancy.   Shingles Vaccine (Shingrix) - COST NOT COVERED BY MEDICARE PART B  2 shot series recommended in those 19 years and older who have or will have weakened immune systems or those 50 years and older     Health Maintenance Due:  There are no preventive care reminders to display for this patient.  Immunizations Due:      Topic Date Due   • COVID-19 Vaccine (6 - 2024-25 season) 09/01/2024     Advance Directives   What are advance directives?  Advance directives are legal documents that state your wishes and plans for medical care. These plans are made ahead of time in case you lose your ability to make decisions for yourself. Advance directives can apply to any medical decision, such as the treatments you want, and if you want to donate organs.   What are the types of advance directives?  There are many types of advance directives, and each state has rules about how to use them. You may choose a combination of any of the  following:  Living will:  This is a written record of the treatment you want. You can also choose which treatments you do not want, which to limit, and which to stop at a certain time. This includes surgery, medicine, IV fluid, and tube feedings.   Durable power of  for healthcare (DPAHC):  This is a written record that states who you want to make healthcare choices for you when you are unable to make them for yourself. This person, called a proxy, is usually a family member or a friend. You may choose more than 1 proxy.  Do not resuscitate (DNR) order:  A DNR order is used in case your heart stops beating or you stop breathing. It is a request not to have certain forms of treatment, such as CPR. A DNR order may be included in other types of advance directives.  Medical directive:  This covers the care that you want if you are in a coma, near death, or unable to make decisions for yourself. You can list the treatments you want for each condition. Treatment may include pain medicine, surgery, blood transfusions, dialysis, IV or tube feedings, and a ventilator (breathing machine).  Values history:  This document has questions about your views, beliefs, and how you feel and think about life. This information can help others choose the care that you would choose.  Why are advance directives important?  An advance directive helps you control your care. Although spoken wishes may be used, it is better to have your wishes written down. Spoken wishes can be misunderstood, or not followed. Treatments may be given even if you do not want them. An advance directive may make it easier for your family to make difficult choices about your care.       © Copyright [employer] 2018 Information is for End User's use only and may not be sold, redistributed or otherwise used for commercial purposes. All illustrations and images included in CareNotes® are the copyrighted property of [employer] or [employer]

## 2025-03-28 ENCOUNTER — RESULTS FOLLOW-UP (OUTPATIENT)
Dept: FAMILY MEDICINE CLINIC | Facility: CLINIC | Age: 89
End: 2025-03-28

## 2025-03-28 DIAGNOSIS — K52.9 CHRONIC DIARRHEA: Primary | ICD-10-CM

## 2025-03-31 ENCOUNTER — APPOINTMENT (OUTPATIENT)
Dept: LAB | Facility: CLINIC | Age: 89
End: 2025-03-31
Payer: MEDICARE

## 2025-03-31 PROCEDURE — 82653 EL-1 FECAL QUANTITATIVE: CPT

## 2025-03-31 PROCEDURE — 87209 SMEAR COMPLEX STAIN: CPT

## 2025-03-31 PROCEDURE — 87177 OVA AND PARASITES SMEARS: CPT

## 2025-03-31 PROCEDURE — 87505 NFCT AGENT DETECTION GI: CPT

## 2025-03-31 PROCEDURE — 84377 SUGARS MULTIPLE QUAL: CPT

## 2025-04-01 LAB
C COLI+JEJUNI TUF STL QL NAA+PROBE: NEGATIVE
C DIFF TOX GENS STL QL NAA+PROBE: NEGATIVE
EC STX1+STX2 GENES STL QL NAA+PROBE: NEGATIVE
SALMONELLA SP SPAO STL QL NAA+PROBE: NEGATIVE
SHIGELLA SP+EIEC IPAH STL QL NAA+PROBE: NEGATIVE

## 2025-04-02 LAB — ELASTASE PANC STL-MCNT: 767 UG/G

## 2025-04-07 LAB — SCAN RESULT: NORMAL

## 2025-04-10 ENCOUNTER — OFFICE VISIT (OUTPATIENT)
Dept: FAMILY MEDICINE CLINIC | Facility: CLINIC | Age: 89
End: 2025-04-10
Payer: MEDICARE

## 2025-04-10 VITALS
HEART RATE: 68 BPM | WEIGHT: 138 LBS | HEIGHT: 70 IN | BODY MASS INDEX: 19.76 KG/M2 | OXYGEN SATURATION: 99 % | SYSTOLIC BLOOD PRESSURE: 116 MMHG | DIASTOLIC BLOOD PRESSURE: 68 MMHG

## 2025-04-10 DIAGNOSIS — K52.9 CHRONIC DIARRHEA: Primary | ICD-10-CM

## 2025-04-10 DIAGNOSIS — K21.9 GASTROESOPHAGEAL REFLUX DISEASE WITHOUT ESOPHAGITIS: ICD-10-CM

## 2025-04-10 PROCEDURE — G2211 COMPLEX E/M VISIT ADD ON: HCPCS | Performed by: FAMILY MEDICINE

## 2025-04-10 PROCEDURE — 99213 OFFICE O/P EST LOW 20 MIN: CPT | Performed by: FAMILY MEDICINE

## 2025-04-10 RX ORDER — FAMOTIDINE 20 MG/1
20 TABLET, FILM COATED ORAL
Qty: 30 TABLET | Refills: 0 | Status: SHIPPED | OUTPATIENT
Start: 2025-04-10

## 2025-04-10 NOTE — PROGRESS NOTES
Name: Alexi Del Rosario      : 1935      MRN: [id number]  Encounter Provider: Buck García MD  Encounter Date: 4/10/2025   Encounter department: [facility]    :  Assessment & Plan  Chronic diarrhea    Orders:    Ambulatory Referral to Gastroenterology; Future    famotidine (PEPCID) 20 mg tablet; Take 1 tablet (20 mg total) by mouth daily after dinner    Gastroesophageal reflux disease without esophagitis    Orders:    famotidine (PEPCID) 20 mg tablet; Take 1 tablet (20 mg total) by mouth daily after dinner        Assessment & Plan  1. Diarrhea.  The patient's blood work is stable, and there is no need for any medication changes. The stool sample results were negative for pancreatic, absorption, or infectious issues, including parasites. He has been advised to discontinue all supplements, including fish oil, vitamin E, coenzyme Q10, glucosamine chondroitin, lutein, multivitamin, and vitamin C. He should continue taking aspirin, atorvastatin, and lisinopril. A prescription for Pepcid has been provided, to be taken after dinner. He has also been advised to avoid milk consumption at night and limit it to the morning only. A referral to a gastroenterologist has been made for further evaluation.       History of Present Illness     History of Present Illness  The patient presents for evaluation of diarrhea.    He reports experiencing intermittent episodes of watery diarrhea, which have been occurring for the past 2 months. His most recent bowel movement was normal, with the exception of some lumpy stool towards the end. He has observed that abstaining from milk consumption at night results in discomfort. He does not consume coffee but admits to drinking green tea. He is currently on a regimen of fish oil, vitamin E, coenzyme Q10, glucosamine chondroitin, lutein, multivitamin, vitamin C, and half a tablet of aspirin. His prescription medications include lisinopril and  "atorvastatin.    MEDICATIONS  - Fish oil  - Vitamin E  - Coenzyme Q10  - Glucosamine chondroitin  - Lutein  - Multivitamin  - Vitamin C  - Aspirin  - Lisinopril  - Atorvastatin     Review of Systems   Constitutional:  Negative for activity change, appetite change and fever.   HENT:  Negative for congestion, nosebleeds and trouble swallowing.    Eyes:  Negative for itching.   Respiratory:  Negative for cough and chest tightness.    Cardiovascular:  Negative for chest pain and palpitations.   Gastrointestinal:  Positive for diarrhea. Negative for abdominal pain, constipation and nausea.   Endocrine: Negative for cold intolerance.   Genitourinary:  Negative for frequency.   Musculoskeletal:  Negative for gait problem and joint swelling.   Skin:  Negative for rash.   Allergic/Immunologic: Negative for immunocompromised state.   Neurological:  Negative for dizziness, tremors, seizures, syncope and headaches.   Psychiatric/Behavioral:  Negative for hallucinations and suicidal ideas.      Objective   /68   Pulse 68   Ht 5' 10\" (1.778 m)   Wt 62.6 kg (138 lb)   SpO2 99%   BMI 19.80 kg/m²     Physical Exam    Physical Exam  Vitals and nursing note reviewed.   Constitutional:       Appearance: He is well-developed.   HENT:      Head: Normocephalic and atraumatic.   Eyes:      Conjunctiva/sclera: Conjunctivae normal.      Pupils: Pupils are equal, round, and reactive to light.   Cardiovascular:      Rate and Rhythm: Normal rate and regular rhythm.      Heart sounds: Normal heart sounds.   Pulmonary:      Effort: Pulmonary effort is normal.      Breath sounds: Normal breath sounds. No wheezing or rales.   Abdominal:      General: Bowel sounds are normal. There is no distension.      Palpations: Abdomen is soft.      Tenderness: There is no abdominal tenderness.   Musculoskeletal:         General: No tenderness. Normal range of motion.      Cervical back: Normal range of motion and neck supple.   Skin:     General: " Skin is warm and dry.      Findings: No rash.   Neurological:      Mental Status: He is alert and oriented to person, place, and time.      Cranial Nerves: No cranial nerve deficit.      Sensory: No sensory deficit.      Coordination: Coordination normal.   Psychiatric:         Behavior: Behavior normal.         Thought Content: Thought content normal.         Judgment: Judgment normal.

## 2025-07-01 ENCOUNTER — TELEPHONE (OUTPATIENT)
Age: 89
End: 2025-07-01

## 2025-07-01 NOTE — TELEPHONE ENCOUNTER
SPOKE WITH PT AND DAUGHTER TIM, SCHEDULED SOONER NEW PT APPOINTMENT. REQUESTING IF THERE ARE ANY SOONER APPOINTMENTS, PLEASE CALL [phone]. THANK YOU.

## 2025-07-01 NOTE — TELEPHONE ENCOUNTER
FYI: patient's daughter stated that pt has a an appt with gastro this month and will be evaluated.

## 2025-07-23 ENCOUNTER — TELEPHONE (OUTPATIENT)
Age: 89
End: 2025-07-23

## 2025-07-23 ENCOUNTER — CONSULT (OUTPATIENT)
Age: 89
End: 2025-07-23
Payer: MEDICARE

## 2025-07-23 VITALS
BODY MASS INDEX: 19.47 KG/M2 | HEIGHT: 70 IN | DIASTOLIC BLOOD PRESSURE: 84 MMHG | HEART RATE: 60 BPM | WEIGHT: 136 LBS | SYSTOLIC BLOOD PRESSURE: 129 MMHG

## 2025-07-23 DIAGNOSIS — R63.4 UNEXPLAINED WEIGHT LOSS: ICD-10-CM

## 2025-07-23 DIAGNOSIS — K62.5 BRBPR (BRIGHT RED BLOOD PER RECTUM): Primary | ICD-10-CM

## 2025-07-23 DIAGNOSIS — R19.7 DIARRHEA, UNSPECIFIED TYPE: ICD-10-CM

## 2025-07-23 PROCEDURE — G2211 COMPLEX E/M VISIT ADD ON: HCPCS | Performed by: NURSE PRACTITIONER

## 2025-07-23 PROCEDURE — 99204 OFFICE O/P NEW MOD 45 MIN: CPT | Performed by: NURSE PRACTITIONER

## 2025-07-23 RX ORDER — DICYCLOMINE HCL 20 MG
20 TABLET ORAL 2 TIMES DAILY WITH MEALS
Qty: 180 TABLET | Refills: 1 | Status: SHIPPED | OUTPATIENT
Start: 2025-07-23 | End: 2025-10-21

## 2025-07-23 RX ORDER — SODIUM CHLORIDE, SODIUM LACTATE, POTASSIUM CHLORIDE, CALCIUM CHLORIDE 600; 310; 30; 20 MG/100ML; MG/100ML; MG/100ML; MG/100ML
125 INJECTION, SOLUTION INTRAVENOUS CONTINUOUS
OUTPATIENT
Start: 2025-07-23

## 2025-07-23 NOTE — TELEPHONE ENCOUNTER
Pharmacy calling regarding Bentyl.  Flagged d/t not recommended in elderly population.  Needs verification.    CB: [phone]

## 2025-07-23 NOTE — ASSESSMENT & PLAN NOTE
Patient reports unexplained weight loss over the last 5 to 6 months.  Patient reports he he lost approximately 25 pounds over the last 5 to 6 months patient reports he does eat 3 meals a day but he does not eat large meals his appetite has been decreased.  Patient is currently taking supplemental nutritional drinks boost daily.  Decreased appetite may be secondary to age and underlying medical conditions contributing to decreased appetite but need to rule out malignancy as contributing factor to unexplained weight loss.   Orders:    Colonoscopy; Future    EGD; Future    polyethylene glycol (GOLYTELY) 4000 mL solution; Take 4,000 mL by mouth once for 1 dose Take as directed by office prior to procedure  Continue nutritional supplements boost with each meal  If EGD and colonoscopy unrevealing of cause a weight loss would recommend CT abdomen and pelvis to rule out any malignancy as contributing factor.

## 2025-07-23 NOTE — PATIENT INSTRUCTIONS
Scheduled date of EGD/colonoscopy (as of today): Dr. Galarza  Physician performing EGD/colonoscopy:8/22/25  Location of EGD/colonoscopy:[facility] bowel prep reviewed with patient:Orestes  Instructions reviewed with patient by:Carmen  Clearances:  N/A

## 2025-07-23 NOTE — ASSESSMENT & PLAN NOTE
Patient reports intermittent episodes of bright red blood per rectum associated with diarrhea over the last 5 to 6 months.  Patient reports he will move his bowels several times a day which is associated with urgency and incontinence.  Patient could not give me an exact number of times he moves his bowels daily, he reports that number bowel movements daily vary.  Patient reports previous colonoscopy approximately 10 years ago report is not available.  Patient did see his PCP concerning diarrhea and blood in stool stool studies were ordered which were negative for infection.  Differential diagnosis include microscopic colitis or lesion. BRBPR may be secondary to hemorrhoids, ulceration, or lesion.  Orders:    Colonoscopy; Future    polyethylene glycol (GOLYTELY) 4000 mL solution; Take 4,000 mL by mouth once for 1 dose Take as directed by office prior to procedure    dicyclomine (BENTYL) 20 mg tablet; Take 1 tablet (20 mg total) by mouth 2 (two) times a day with meals

## 2025-07-23 NOTE — TELEPHONE ENCOUNTER
Spoke to pharmacist concerning medication where it is being ordered due to diarrhea.  She will make sure that prescription bottle has the precautions on it concerning lightheadedness and dizziness.  Please call and inform him to be careful with the dicyclomine it can cause lightheadedness, sleepiness and dizziness and if he has any side effects from the medication he should reach out to the GI office

## 2025-07-23 NOTE — PROGRESS NOTES
Name: Alexi Del Rosario      : 1935      MRN: [id number]  Encounter Provider: PARI Esteban  Encounter Date: 2025   Encounter department: [facility]  :  Assessment & Plan  Unexplained weight loss  Patient reports unexplained weight loss over the last 5 to 6 months.  Patient reports he he lost approximately 25 pounds over the last 5 to 6 months patient reports he does eat 3 meals a day but he does not eat large meals his appetite has been decreased.  Patient is currently taking supplemental nutritional drinks boost daily.  Decreased appetite may be secondary to age and underlying medical conditions contributing to decreased appetite but need to rule out malignancy as contributing factor to unexplained weight loss.   Orders:    Colonoscopy; Future    EGD; Future    polyethylene glycol (GOLYTELY) 4000 mL solution; Take 4,000 mL by mouth once for 1 dose Take as directed by office prior to procedure  Continue nutritional supplements boost with each meal  If EGD and colonoscopy unrevealing of cause a weight loss would recommend CT abdomen and pelvis to rule out any malignancy as contributing factor.  BRBPR (bright red blood per rectum)  Diarrhea, unspecified type  Patient reports intermittent episodes of bright red blood per rectum associated with diarrhea over the last 5 to 6 months.  Patient reports he will move his bowels several times a day which is associated with urgency and incontinence.  Patient could not give me an exact number of times he moves his bowels daily, he reports that number bowel movements daily vary.  Patient reports previous colonoscopy approximately 10 years ago report is not available.  Patient did see his PCP concerning diarrhea and blood in stool stool studies were ordered which were negative for infection.  Differential diagnosis include microscopic colitis or lesion. BRBPR may be secondary to hemorrhoids, ulceration, or  lesion.  Orders:    Colonoscopy; Future    polyethylene glycol (GOLYTELY) 4000 mL solution; Take 4,000 mL by mouth once for 1 dose Take as directed by office prior to procedure    dicyclomine (BENTYL) 20 mg tablet; Take 1 tablet (20 mg total) by mouth 2 (two) times a day with meals    Follow up after procedure    History of Present Illness   HPI  Alexi Del Rosario is a 89 y.o. male with past medical history of CAD, HTN, mitral valve prolapse, pulmonary hypertension, pulmonary fibrosis, hyperlipidemia, iron deficiency, unexplained weight loss, diarrhea, and prediabetes who presents to office as new patient. Patient reports intermittent episodes of bright red blood per rectum associated with diarrhea over the last 5 to 6 months.  Patient reports he will move his bowels several times a day which is associated with urgency and incontinence.  Patient could not give me an exact number of times he moves his bowels daily, he reports that number bowel movements daily vary. Patient did see his PCP concerning diarrhea and blood in stool stool studies were ordered which were negative for infection.  He denies nausea, vomiting, acid reflux, heartburn, epigastric or abdominal pain.  Abdomen exam benign no abdominal tenderness or guarding.    Stool study for C. difficile, bacteria panel, ova and parasite, and pancreatic elastase done 3/31 negative for infection and pancreatic elastase 767 and within normal limits.  Iron panel done 3/27/2025 within normal limits.Thyroid study TSH free T4 within normal limits.  CBC hemoglobin 12.4 white blood count 9.60 and platelets 272.  CMP within normal limits except glucose 106, alk phos 125, and BUN 29.      Patient reports he he lost approximately 25 pounds over the last 5 to 6 months patient reports he does eat 3 meals a day but he does not eat large meals his appetite has been decreased.  Patient is currently taking supplemental nutritional drinks boost daily.    Patient reports colonoscopy  over 10 years ago.  Report is not available.  Patient does not smoke.  Patient denies illicit drug use.  No family history of gastric or colon cancer.    Review of Systems   Constitutional:  Positive for unexpected weight change. Negative for chills and fever.   HENT:  Negative for ear pain and sore throat.    Eyes:  Negative for pain and visual disturbance.   Respiratory:  Negative for cough and shortness of breath.    Cardiovascular:  Negative for chest pain and palpitations.   Gastrointestinal:  Positive for blood in stool and diarrhea. Negative for abdominal pain and vomiting.   Genitourinary:  Negative for dysuria and hematuria.   Musculoskeletal:  Negative for arthralgias and back pain.   Skin:  Negative for color change and rash.   Neurological:  Negative for seizures and syncope.   All other systems reviewed and are negative.    Medical History Reviewed by provider this encounter:  Tobacco  Allergies  Meds  Problems  Med Hx  Surg Hx  Fam Hx     .  Past Medical History   Past Medical History[1]  Past Surgical History[2]  Family History[3]   reports that he has never smoked. He has never used smokeless tobacco.  Current Outpatient Medications   Medication Instructions    Ascorbic Acid (Vitamin C) (VITAMIN C) 500 mg, Daily    aspirin 325 mg, Every other day    atorvastatin (LIPITOR) 40 mg, Oral, Daily    Coenzyme Q10 10 mg, Daily    dicyclomine (BENTYL) 20 mg, Oral, 2 times daily with meals    Glucosamine-Chondroit-Vit C-Mn (Glucosamine Chondr 1500 Complx) CAPS Daily    lisinopril (ZESTRIL) 2.5 mg tablet     Lutein 10 MG TABS Daily    multivitamin (THERAGRAN) TABS 1 tablet, Daily    polyethylene glycol (GOLYTELY) 4000 mL solution 4,000 mL, Oral, Once, Take as directed by office prior to procedure    PreviDent 5000 Booster Plus 1.1 % PSTE No dose, route, or frequency recorded.    SF 5000 Plus 1.1 % CREA    Allergies[4]   Medications Ordered Prior to Encounter[5]   Social History[6]     Objective   BP  "129/84 (BP Location: Left arm, Patient Position: Sitting, Cuff Size: Standard)   Pulse 60   Ht 5' 10\" (1.778 m)   Wt 61.7 kg (136 lb)   BMI 19.51 kg/m²      Physical Exam  Vitals and nursing note reviewed.   Constitutional:       General: He is not in acute distress.     Appearance: He is well-developed.   HENT:      Head: Normocephalic and atraumatic.     Eyes:      Conjunctiva/sclera: Conjunctivae normal.       Cardiovascular:      Rate and Rhythm: Normal rate and regular rhythm.      Pulses: Normal pulses.      Heart sounds: Normal heart sounds. No murmur heard.  Pulmonary:      Effort: Pulmonary effort is normal. No respiratory distress.      Breath sounds: Normal breath sounds. No stridor. No wheezing, rhonchi or rales.   Abdominal:      General: Bowel sounds are normal. There is no distension.      Palpations: Abdomen is soft. There is no mass.      Tenderness: There is no abdominal tenderness. There is no guarding or rebound.     Musculoskeletal:         General: No swelling.      Cervical back: Neck supple.      Right lower leg: No edema.      Left lower leg: No edema.     Skin:     General: Skin is warm and dry.      Capillary Refill: Capillary refill takes less than 2 seconds.      Coloration: Skin is not jaundiced or pale.     Neurological:      Mental Status: He is alert and oriented to person, place, and time.     Psychiatric:         Mood and Affect: Mood normal.                [1]   Past Medical History:  Diagnosis Date    Cancer (HCC)     Lesion left calf removed    Hypertension     Pulmonary hypertension (HCC)    [2]   Past Surgical History:  Procedure Laterality Date    APPENDECTOMY      CATARACT EXTRACTION, BILATERAL      COLONOSCOPY  06/2018   [3]   Family History  Problem Relation Name Age of Onset    Heart disease Father      Sudden death Father  72    COPD Neg Hx      Asthma Neg Hx      Lung cancer Neg Hx     [4] No Known Allergies  [5]   Current Outpatient Medications on File Prior to " Visit   Medication Sig Dispense Refill    Ascorbic Acid, Vitamin C, (VITAMIN C) 100 MG tablet Take 500 mg by mouth in the morning.      aspirin 325 mg tablet Take 325 mg by mouth every other day Take 1/2 of pill every other day      atorvastatin (LIPITOR) 40 mg tablet Take 1 tablet (40 mg total) by mouth daily 90 tablet 1    Coenzyme Q10 10 MG capsule Take 10 mg by mouth in the morning.      Glucosamine-Chondroit-Vit C-Mn (Glucosamine Chondr 1500 Complx) CAPS Take by mouth in the morning.      lisinopril (ZESTRIL) 2.5 mg tablet       Lutein 10 MG TABS Take by mouth in the morning.      multivitamin (THERAGRAN) TABS Take 1 tablet by mouth in the morning.      PreviDent 5000 Booster Plus 1.1 % PSTE       SF 5000 Plus 1.1 % CREA       [DISCONTINUED] famotidine (PEPCID) 20 mg tablet Take 1 tablet (20 mg total) by mouth daily after dinner 30 tablet 0     No current facility-administered medications on file prior to visit.   [6]   Social History  Tobacco Use    Smoking status: Never    Smokeless tobacco: Never   Vaping Use    Vaping status: Never Used